# Patient Record
Sex: FEMALE | Race: WHITE | Employment: FULL TIME | URBAN - METROPOLITAN AREA
[De-identification: names, ages, dates, MRNs, and addresses within clinical notes are randomized per-mention and may not be internally consistent; named-entity substitution may affect disease eponyms.]

---

## 2020-10-26 DIAGNOSIS — Z11.59 ENCOUNTER FOR SCREENING FOR OTHER VIRAL DISEASES: Primary | ICD-10-CM

## 2020-12-05 ENCOUNTER — HOSPITAL ENCOUNTER (OUTPATIENT)
Dept: LAB | Facility: CLINIC | Age: 40
Discharge: HOME | End: 2020-12-05
Attending: ORTHOPAEDIC SURGERY | Admitting: ORTHOPAEDIC SURGERY
Payer: COMMERCIAL

## 2020-12-05 DIAGNOSIS — Z11.59 ENCOUNTER FOR SCREENING FOR OTHER VIRAL DISEASES: ICD-10-CM

## 2020-12-05 LAB
SARS-COV-2 RNA SPEC QL NAA+PROBE: NORMAL
SPECIMEN SOURCE: NORMAL

## 2020-12-05 PROCEDURE — U0003 INFECTIOUS AGENT DETECTION BY NUCLEIC ACID (DNA OR RNA); SEVERE ACUTE RESPIRATORY SYNDROME CORONAVIRUS 2 (SARS-COV-2) (CORONAVIRUS DISEASE [COVID-19]), AMPLIFIED PROBE TECHNIQUE, MAKING USE OF HIGH THROUGHPUT TECHNOLOGIES AS DESCRIBED BY CMS-2020-01-R: HCPCS | Performed by: ORTHOPAEDIC SURGERY

## 2020-12-06 LAB
LABORATORY COMMENT REPORT: NORMAL
SARS-COV-2 RNA SPEC QL NAA+PROBE: NEGATIVE
SPECIMEN SOURCE: NORMAL

## 2020-12-07 RX ORDER — HYDROXYZINE HYDROCHLORIDE 25 MG/1
25 TABLET, FILM COATED ORAL 3 TIMES DAILY
COMMUNITY

## 2020-12-07 RX ORDER — CITALOPRAM HYDROBROMIDE 20 MG/1
20 TABLET ORAL DAILY
COMMUNITY

## 2020-12-07 RX ORDER — POLYETHYLENE GLYCOL 3350 17 G/17G
1 POWDER, FOR SOLUTION ORAL DAILY
COMMUNITY

## 2020-12-07 RX ORDER — FLUTICASONE PROPIONATE 50 MCG
2 SPRAY, SUSPENSION (ML) NASAL DAILY
COMMUNITY

## 2020-12-08 ENCOUNTER — ANESTHESIA EVENT (OUTPATIENT)
Dept: SURGERY | Facility: CLINIC | Age: 40
End: 2020-12-08
Payer: COMMERCIAL

## 2020-12-09 ENCOUNTER — ANCILLARY PROCEDURE (OUTPATIENT)
Dept: ULTRASOUND IMAGING | Facility: CLINIC | Age: 40
End: 2020-12-09
Payer: COMMERCIAL

## 2020-12-09 ENCOUNTER — ANESTHESIA (OUTPATIENT)
Dept: SURGERY | Facility: CLINIC | Age: 40
End: 2020-12-09
Payer: COMMERCIAL

## 2020-12-09 ENCOUNTER — HOSPITAL ENCOUNTER (OUTPATIENT)
Facility: CLINIC | Age: 40
Discharge: HOME | End: 2020-12-09
Attending: ORTHOPAEDIC SURGERY | Admitting: ORTHOPAEDIC SURGERY
Payer: COMMERCIAL

## 2020-12-09 VITALS
OXYGEN SATURATION: 98 % | WEIGHT: 185.19 LBS | DIASTOLIC BLOOD PRESSURE: 64 MMHG | HEIGHT: 67 IN | RESPIRATION RATE: 11 BRPM | HEART RATE: 60 BPM | BODY MASS INDEX: 29.07 KG/M2 | SYSTOLIC BLOOD PRESSURE: 121 MMHG | TEMPERATURE: 98.1 F

## 2020-12-09 DIAGNOSIS — Z98.890 S/P KNEE SURGERY: Primary | ICD-10-CM

## 2020-12-09 LAB
ABO + RH BLD: NORMAL
ABO + RH BLD: NORMAL
BLD GP AB SCN SERPL QL: NORMAL
BLOOD BANK CMNT PATIENT-IMP: NORMAL
GLUCOSE SERPL-MCNC: 86 MG/DL (ref 70–99)
HCG UR QL: NEGATIVE
HGB BLD-MCNC: 12 G/DL (ref 11.7–15.7)
SPECIMEN EXP DATE BLD: NORMAL

## 2020-12-09 PROCEDURE — 250N000011 HC RX IP 250 OP 636: Performed by: NURSE ANESTHETIST, CERTIFIED REGISTERED

## 2020-12-09 PROCEDURE — 761N000007 HC RECOVERY PHASE 2 EACH 15 MINS: Performed by: ORTHOPAEDIC SURGERY

## 2020-12-09 PROCEDURE — 85018 HEMOGLOBIN: CPT | Performed by: ANESTHESIOLOGY

## 2020-12-09 PROCEDURE — 258N000003 HC RX IP 258 OP 636: Performed by: ANESTHESIOLOGY

## 2020-12-09 PROCEDURE — 86850 RBC ANTIBODY SCREEN: CPT | Performed by: ANESTHESIOLOGY

## 2020-12-09 PROCEDURE — C1713 ANCHOR/SCREW BN/BN,TIS/BN: HCPCS | Performed by: ORTHOPAEDIC SURGERY

## 2020-12-09 PROCEDURE — 250N000011 HC RX IP 250 OP 636

## 2020-12-09 PROCEDURE — 272N000001 HC OR GENERAL SUPPLY STERILE: Performed by: ORTHOPAEDIC SURGERY

## 2020-12-09 PROCEDURE — 370N000001 HC ANESTHESIA TECHNICAL FEE, 1ST 30 MIN: Performed by: ORTHOPAEDIC SURGERY

## 2020-12-09 PROCEDURE — 360N000022 HC SURGERY LEVEL 3 1ST 30 MIN - UMMC: Performed by: ORTHOPAEDIC SURGERY

## 2020-12-09 PROCEDURE — 999N000139 HC STATISTIC PRE-PROCEDURE ASSESSMENT II: Performed by: ORTHOPAEDIC SURGERY

## 2020-12-09 PROCEDURE — 761N000001 HC RECOVERY PHASE 1 LEVEL 1 FIRST HR: Performed by: ORTHOPAEDIC SURGERY

## 2020-12-09 PROCEDURE — 250N000011 HC RX IP 250 OP 636: Performed by: ORTHOPAEDIC SURGERY

## 2020-12-09 PROCEDURE — 250N000013 HC RX MED GY IP 250 OP 250 PS 637: Performed by: ORTHOPAEDIC SURGERY

## 2020-12-09 PROCEDURE — 370N000002 HC ANESTHESIA TECHNICAL FEE, EACH ADDTL 15 MIN: Performed by: ORTHOPAEDIC SURGERY

## 2020-12-09 PROCEDURE — 258N000003 HC RX IP 258 OP 636: Performed by: REGISTERED NURSE

## 2020-12-09 PROCEDURE — 250N000011 HC RX IP 250 OP 636: Performed by: PHYSICIAN ASSISTANT

## 2020-12-09 PROCEDURE — 250N000009 HC RX 250: Performed by: REGISTERED NURSE

## 2020-12-09 PROCEDURE — 250N000011 HC RX IP 250 OP 636: Performed by: REGISTERED NURSE

## 2020-12-09 PROCEDURE — 360N000023 HC SURGERY LEVEL 3 EA 15 ADDTL MIN UMMC: Performed by: ORTHOPAEDIC SURGERY

## 2020-12-09 PROCEDURE — 81025 URINE PREGNANCY TEST: CPT | Performed by: ANESTHESIOLOGY

## 2020-12-09 PROCEDURE — 250N000009 HC RX 250: Performed by: ORTHOPAEDIC SURGERY

## 2020-12-09 PROCEDURE — 82947 ASSAY GLUCOSE BLOOD QUANT: CPT | Performed by: ANESTHESIOLOGY

## 2020-12-09 PROCEDURE — 272N000002 HC OR SUPPLY OTHER OPNP: Performed by: ORTHOPAEDIC SURGERY

## 2020-12-09 PROCEDURE — 258N000001 HC RX 258: Performed by: ORTHOPAEDIC SURGERY

## 2020-12-09 PROCEDURE — 86900 BLOOD TYPING SEROLOGIC ABO: CPT | Performed by: ANESTHESIOLOGY

## 2020-12-09 PROCEDURE — 86901 BLOOD TYPING SEROLOGIC RH(D): CPT | Performed by: ANESTHESIOLOGY

## 2020-12-09 DEVICE — IMPLANTABLE DEVICE: Type: IMPLANTABLE DEVICE | Site: KNEE | Status: FUNCTIONAL

## 2020-12-09 RX ORDER — OXYCODONE HYDROCHLORIDE 5 MG/1
5 TABLET ORAL
Status: DISCONTINUED | OUTPATIENT
Start: 2020-12-09 | End: 2020-12-09 | Stop reason: HOSPADM

## 2020-12-09 RX ORDER — NALOXONE HYDROCHLORIDE 0.4 MG/ML
0.2 INJECTION, SOLUTION INTRAMUSCULAR; INTRAVENOUS; SUBCUTANEOUS
Status: DISCONTINUED | OUTPATIENT
Start: 2020-12-09 | End: 2020-12-09 | Stop reason: HOSPADM

## 2020-12-09 RX ORDER — NALOXONE HYDROCHLORIDE 0.4 MG/ML
0.4 INJECTION, SOLUTION INTRAMUSCULAR; INTRAVENOUS; SUBCUTANEOUS
Status: DISCONTINUED | OUTPATIENT
Start: 2020-12-09 | End: 2020-12-09 | Stop reason: HOSPADM

## 2020-12-09 RX ORDER — ACETAMINOPHEN 325 MG/1
650 TABLET ORAL EVERY 4 HOURS PRN
Qty: 50 TABLET | Refills: 0 | Status: SHIPPED | OUTPATIENT
Start: 2020-12-09

## 2020-12-09 RX ORDER — SODIUM CHLORIDE, SODIUM LACTATE, POTASSIUM CHLORIDE, CALCIUM CHLORIDE 600; 310; 30; 20 MG/100ML; MG/100ML; MG/100ML; MG/100ML
INJECTION, SOLUTION INTRAVENOUS CONTINUOUS
Status: DISCONTINUED | OUTPATIENT
Start: 2020-12-09 | End: 2020-12-09 | Stop reason: HOSPADM

## 2020-12-09 RX ORDER — ACETAMINOPHEN 325 MG/1
650 TABLET ORAL
Status: DISCONTINUED | OUTPATIENT
Start: 2020-12-09 | End: 2020-12-09 | Stop reason: HOSPADM

## 2020-12-09 RX ORDER — BUPIVACAINE HYDROCHLORIDE AND EPINEPHRINE 5; 5 MG/ML; UG/ML
INJECTION, SOLUTION PERINEURAL PRN
Status: DISCONTINUED | OUTPATIENT
Start: 2020-12-09 | End: 2020-12-09 | Stop reason: HOSPADM

## 2020-12-09 RX ORDER — MEPERIDINE HYDROCHLORIDE 25 MG/ML
12.5 INJECTION INTRAMUSCULAR; INTRAVENOUS; SUBCUTANEOUS
Status: DISCONTINUED | OUTPATIENT
Start: 2020-12-09 | End: 2020-12-09 | Stop reason: HOSPADM

## 2020-12-09 RX ORDER — HYDROXYZINE HYDROCHLORIDE 25 MG/1
50 TABLET, FILM COATED ORAL EVERY 6 HOURS PRN
Status: DISCONTINUED | OUTPATIENT
Start: 2020-12-09 | End: 2020-12-09 | Stop reason: HOSPADM

## 2020-12-09 RX ORDER — DEXAMETHASONE SODIUM PHOSPHATE 4 MG/ML
INJECTION, SOLUTION INTRA-ARTICULAR; INTRALESIONAL; INTRAMUSCULAR; INTRAVENOUS; SOFT TISSUE PRN
Status: DISCONTINUED | OUTPATIENT
Start: 2020-12-09 | End: 2020-12-09

## 2020-12-09 RX ORDER — BUPIVACAINE HYDROCHLORIDE 2.5 MG/ML
INJECTION, SOLUTION EPIDURAL; INFILTRATION; INTRACAUDAL PRN
Status: DISCONTINUED | OUTPATIENT
Start: 2020-12-09 | End: 2020-12-09

## 2020-12-09 RX ORDER — KETOROLAC TROMETHAMINE 30 MG/ML
30 INJECTION, SOLUTION INTRAMUSCULAR; INTRAVENOUS ONCE
Status: COMPLETED | OUTPATIENT
Start: 2020-12-09 | End: 2020-12-09

## 2020-12-09 RX ORDER — OXYCODONE HYDROCHLORIDE 5 MG/1
5-10 TABLET ORAL EVERY 4 HOURS PRN
Qty: 40 TABLET | Refills: 0 | Status: SHIPPED | OUTPATIENT
Start: 2020-12-09

## 2020-12-09 RX ORDER — IBUPROFEN 200 MG
200 TABLET ORAL EVERY 6 HOURS PRN
Status: DISCONTINUED | OUTPATIENT
Start: 2020-12-09 | End: 2020-12-09 | Stop reason: HOSPADM

## 2020-12-09 RX ORDER — ONDANSETRON 4 MG/1
4 TABLET, ORALLY DISINTEGRATING ORAL
Status: DISCONTINUED | OUTPATIENT
Start: 2020-12-09 | End: 2020-12-09 | Stop reason: HOSPADM

## 2020-12-09 RX ORDER — ONDANSETRON 2 MG/ML
INJECTION INTRAMUSCULAR; INTRAVENOUS PRN
Status: DISCONTINUED | OUTPATIENT
Start: 2020-12-09 | End: 2020-12-09

## 2020-12-09 RX ORDER — FENTANYL CITRATE 50 UG/ML
25-50 INJECTION, SOLUTION INTRAMUSCULAR; INTRAVENOUS
Status: DISCONTINUED | OUTPATIENT
Start: 2020-12-09 | End: 2020-12-09 | Stop reason: HOSPADM

## 2020-12-09 RX ORDER — PROPOFOL 10 MG/ML
INJECTION, EMULSION INTRAVENOUS PRN
Status: DISCONTINUED | OUTPATIENT
Start: 2020-12-09 | End: 2020-12-09

## 2020-12-09 RX ORDER — PROPOFOL 10 MG/ML
INJECTION, EMULSION INTRAVENOUS CONTINUOUS PRN
Status: DISCONTINUED | OUTPATIENT
Start: 2020-12-09 | End: 2020-12-09

## 2020-12-09 RX ORDER — AMOXICILLIN 250 MG
1-2 CAPSULE ORAL 2 TIMES DAILY
Qty: 30 TABLET | Refills: 0 | Status: SHIPPED | OUTPATIENT
Start: 2020-12-09

## 2020-12-09 RX ORDER — FLUMAZENIL 0.1 MG/ML
0.2 INJECTION, SOLUTION INTRAVENOUS
Status: DISCONTINUED | OUTPATIENT
Start: 2020-12-09 | End: 2020-12-09 | Stop reason: HOSPADM

## 2020-12-09 RX ORDER — ONDANSETRON 4 MG/1
4 TABLET, ORALLY DISINTEGRATING ORAL EVERY 30 MIN PRN
Status: DISCONTINUED | OUTPATIENT
Start: 2020-12-09 | End: 2020-12-09 | Stop reason: HOSPADM

## 2020-12-09 RX ORDER — KETAMINE HYDROCHLORIDE 10 MG/ML
INJECTION INTRAMUSCULAR; INTRAVENOUS PRN
Status: DISCONTINUED | OUTPATIENT
Start: 2020-12-09 | End: 2020-12-09

## 2020-12-09 RX ORDER — IBUPROFEN 600 MG/1
600 TABLET, FILM COATED ORAL EVERY 6 HOURS PRN
Qty: 30 TABLET | Refills: 0 | Status: SHIPPED | OUTPATIENT
Start: 2020-12-09

## 2020-12-09 RX ORDER — LIDOCAINE 40 MG/G
CREAM TOPICAL
Status: DISCONTINUED | OUTPATIENT
Start: 2020-12-09 | End: 2020-12-09 | Stop reason: HOSPADM

## 2020-12-09 RX ORDER — CEFAZOLIN SODIUM 1 G/3ML
1 INJECTION, POWDER, FOR SOLUTION INTRAMUSCULAR; INTRAVENOUS SEE ADMIN INSTRUCTIONS
Status: DISCONTINUED | OUTPATIENT
Start: 2020-12-09 | End: 2020-12-09 | Stop reason: HOSPADM

## 2020-12-09 RX ORDER — CEFAZOLIN SODIUM 2 G/100ML
2 INJECTION, SOLUTION INTRAVENOUS
Status: DISCONTINUED | OUTPATIENT
Start: 2020-12-09 | End: 2020-12-09 | Stop reason: HOSPADM

## 2020-12-09 RX ORDER — ACETAMINOPHEN 325 MG/1
975 TABLET ORAL ONCE
Status: DISCONTINUED | OUTPATIENT
Start: 2020-12-09 | End: 2020-12-09 | Stop reason: HOSPADM

## 2020-12-09 RX ORDER — ASPIRIN 81 MG/1
162 TABLET ORAL DAILY
Status: DISCONTINUED | OUTPATIENT
Start: 2020-12-10 | End: 2020-12-09 | Stop reason: HOSPADM

## 2020-12-09 RX ORDER — HYDROXYZINE HYDROCHLORIDE 10 MG/1
10 TABLET, FILM COATED ORAL
Status: DISCONTINUED | OUTPATIENT
Start: 2020-12-09 | End: 2020-12-09 | Stop reason: HOSPADM

## 2020-12-09 RX ORDER — ONDANSETRON 4 MG/1
4-8 TABLET, ORALLY DISINTEGRATING ORAL EVERY 8 HOURS PRN
Qty: 8 TABLET | Refills: 0 | Status: SHIPPED | OUTPATIENT
Start: 2020-12-09

## 2020-12-09 RX ORDER — HYDROXYZINE HYDROCHLORIDE 25 MG/1
25 TABLET, FILM COATED ORAL
Status: DISCONTINUED | OUTPATIENT
Start: 2020-12-09 | End: 2020-12-09 | Stop reason: HOSPADM

## 2020-12-09 RX ORDER — IBUPROFEN 200 MG
800 TABLET ORAL EVERY 4 HOURS PRN
COMMUNITY

## 2020-12-09 RX ORDER — ONDANSETRON 2 MG/ML
4 INJECTION INTRAMUSCULAR; INTRAVENOUS EVERY 30 MIN PRN
Status: DISCONTINUED | OUTPATIENT
Start: 2020-12-09 | End: 2020-12-09 | Stop reason: HOSPADM

## 2020-12-09 RX ADMIN — PROPOFOL 50 MG: 10 INJECTION, EMULSION INTRAVENOUS at 13:15

## 2020-12-09 RX ADMIN — PROPOFOL 200 MG: 10 INJECTION, EMULSION INTRAVENOUS at 13:00

## 2020-12-09 RX ADMIN — PROPOFOL 150 MCG/KG/MIN: 10 INJECTION, EMULSION INTRAVENOUS at 13:00

## 2020-12-09 RX ADMIN — BUPIVACAINE HYDROCHLORIDE 30 ML: 2.5 INJECTION, SOLUTION EPIDURAL; INFILTRATION; INTRACAUDAL at 12:05

## 2020-12-09 RX ADMIN — DEXAMETHASONE SODIUM PHOSPHATE 6 MG: 4 INJECTION, SOLUTION INTRAMUSCULAR; INTRAVENOUS at 13:13

## 2020-12-09 RX ADMIN — ONDANSETRON 4 MG: 2 INJECTION INTRAMUSCULAR; INTRAVENOUS at 14:18

## 2020-12-09 RX ADMIN — DEXMEDETOMIDINE HYDROCHLORIDE 12 MCG: 100 INJECTION, SOLUTION INTRAVENOUS at 13:31

## 2020-12-09 RX ADMIN — FENTANYL CITRATE 25 MCG: 50 INJECTION INTRAMUSCULAR; INTRAVENOUS at 12:11

## 2020-12-09 RX ADMIN — PROPOFOL 50 MG: 10 INJECTION, EMULSION INTRAVENOUS at 13:24

## 2020-12-09 RX ADMIN — Medication 30 MG: at 13:22

## 2020-12-09 RX ADMIN — Medication 20 MG: at 14:02

## 2020-12-09 RX ADMIN — ONDANSETRON 4 MG: 2 INJECTION INTRAMUSCULAR; INTRAVENOUS at 16:17

## 2020-12-09 RX ADMIN — DEXMEDETOMIDINE HYDROCHLORIDE 16 MCG: 100 INJECTION, SOLUTION INTRAVENOUS at 13:36

## 2020-12-09 RX ADMIN — ACETAMINOPHEN 650 MG: 325 TABLET, FILM COATED ORAL at 14:59

## 2020-12-09 RX ADMIN — SODIUM CHLORIDE, POTASSIUM CHLORIDE, SODIUM LACTATE AND CALCIUM CHLORIDE: 600; 310; 30; 20 INJECTION, SOLUTION INTRAVENOUS at 12:45

## 2020-12-09 RX ADMIN — DEXMEDETOMIDINE HYDROCHLORIDE 12 MCG: 100 INJECTION, SOLUTION INTRAVENOUS at 13:27

## 2020-12-09 RX ADMIN — SODIUM CHLORIDE, POTASSIUM CHLORIDE, SODIUM LACTATE AND CALCIUM CHLORIDE: 600; 310; 30; 20 INJECTION, SOLUTION INTRAVENOUS at 14:40

## 2020-12-09 RX ADMIN — MIDAZOLAM 1 MG: 1 INJECTION INTRAMUSCULAR; INTRAVENOUS at 12:12

## 2020-12-09 RX ADMIN — CEFAZOLIN 2 G: 10 INJECTION, POWDER, FOR SOLUTION INTRAVENOUS at 13:16

## 2020-12-09 RX ADMIN — KETOROLAC TROMETHAMINE 30 MG: 30 INJECTION, SOLUTION INTRAMUSCULAR at 15:30

## 2020-12-09 ASSESSMENT — MIFFLIN-ST. JEOR: SCORE: 1542.63

## 2020-12-09 NOTE — ANESTHESIA POSTPROCEDURE EVALUATION
Anesthesia POST Procedure Evaluation    Patient: Lydia Rockwell   MRN:     [id number] Gender:   female   Age:    39 year old :      1980        Preoperative Diagnosis: Knee pain [M25.569]   Procedure(s):  Right Knee Arthroscopy,  Tibial Plateu debridement,  microfracture,  Biocartlidge right knee partial Meniscectomy, chrondroplasty,  allogenic chondrocyte implantation   Postop Comments: No value filed.     Anesthesia Type: General       Disposition: Outpatient   Postop Pain Control: Uneventful            Sign Out: Well controlled pain   PONV: No   Neuro/Psych: Uneventful            Sign Out: Acceptable/Baseline neuro status   Airway/Respiratory: Uneventful            Sign Out: Acceptable/Baseline resp. status   CV/Hemodynamics: Uneventful            Sign Out: Acceptable CV status   Other NRE: NONE   DID A NON-ROUTINE EVENT OCCUR? No    Event details/Postop Comments:  I assessed the patient in PACU prior to discharge.  The patient was awake and alert with minimal to moderate pain which was well controlled with medications and regional anesthetic.  The patient denied any significant nausea.  The patient's heart rate and blood pressure with consistent with her preoperative measurements, and she was breathing comfortably without any signs of respiratory distress.  There were no readily apparent anesthetic complications.  All her questions were answered.         Last Anesthesia Record Vitals:  CRNA VITALS  2020 1407 - 2020 1507      2020             NIBP:  99/65    Pulse:  72    NIBP Mean:  74    Temp:  37.7  C (99.9  F)    SpO2:  100 %    Resp Rate (observed):  16          Last PACU Vitals:  Vitals Value Taken Time   /63 20 1545   Temp 37.7  C (99.9  F) 20 1440   Pulse 63 20 1545   Resp 11 20 1542   SpO2 99 % 20 1555   Temp src     NIBP 99/65 20 1441   Pulse 72 20 1441   SpO2 100 % 20 1441   Resp     Temp 37.7  C (99.9  F) 20 1441    Ht Rate     Temp 2     Vitals shown include unvalidated device data.      Electronically Signed By: Vega Kim MD, December 9, 2020, 3:56 PM

## 2020-12-09 NOTE — ANESTHESIA CARE TRANSFER NOTE
Patient: Lydia Rockwell    Procedure(s):  Right Knee Arthroscopy,  Tibial Plateu debridement,  microfracture,  Biocartlidge right knee partial Meniscectomy, chrondroplasty,  allogenic chondrocyte implantation    Diagnosis: Knee pain [M25.569]  Diagnosis Additional Information: No value filed.    Anesthesia Type:   General     Note:  Airway :Face Mask  Patient transferred to:PACU  Handoff Report: Identifed the Patient, Identified the Reponsible Provider, Reviewed the pertinent medical history, Discussed the surgical course, Reviewed Intra-OP anesthesia mangement and issues during anesthesia, Set expectations for post-procedure period and Allowed opportunity for questions and acknowledgement of understanding      Vitals: (Last set prior to Anesthesia Care Transfer)    CRNA VITALS  12/9/2020 1407 - 12/9/2020 1448      12/9/2020             NIBP:  99/65    Pulse:  72    NIBP Mean:  74    Temp:  37.7  C (99.9  F)    SpO2:  100 %    Resp Rate (observed):  16                Electronically Signed By: DAMON Barber CRNA  December 9, 2020  2:48 PM

## 2020-12-09 NOTE — ANESTHESIA PREPROCEDURE EVALUATION
"Anesthesia Pre-Procedure Evaluation    Patient: Lydia Rockwell   MRN:     [id number] Gender:   female   Age:    39 year old :      1980        Preoperative Diagnosis: Knee pain [M25.569]   Procedure(s):  Right Knee Arthroscopy,  Tibial Plateu debridement,  microfracture,  Biocartlidge right knee partial Meniscectomy, chrondroplasty,  allogenic chondrocyte implantation     LABS:  CBC:   Lab Results   Component Value Date    HGB 12.0 2020     BMP:   Lab Results   Component Value Date    GLC 86 2020     COAGS: No results found for: PTT, INR, FIBR  POC:   Lab Results   Component Value Date    HCG Negative 2020     OTHER: No results found for: PH, LACT, A1C, MIKI, PHOS, MAG, ALBUMIN, PROTTOTAL, ALT, AST, GGT, ALKPHOS, BILITOTAL, BILIDIRECT, LIPASE, AMYLASE, JACOB, TSH, T4, T3, CRP, SED     Preop Vitals    BP Readings from Last 3 Encounters:   20 103/67    Pulse Readings from Last 3 Encounters:   20 66      Resp Readings from Last 3 Encounters:   20 14    SpO2 Readings from Last 3 Encounters:   20 99%      Temp Readings from Last 1 Encounters:   20 37.7  C (99.9  F) (Axillary)    Ht Readings from Last 1 Encounters:   20 1.702 m (5' 7\")      Wt Readings from Last 1 Encounters:   20 84 kg (185 lb 3 oz)    Estimated body mass index is 29 kg/m  as calculated from the following:    Height as of this encounter: 1.702 m (5' 7\").    Weight as of this encounter: 84 kg (185 lb 3 oz).     LDA:  Peripheral IV 20 Left Hand (Active)   Site Assessment WDL 20 1530   Dressing Intervention Padding of hub 20 0957   Phlebitis Scale 0-->no symptoms 20 1530   Infiltration Scale 0 20 1530   Number of days: 0        Past Medical History:   Diagnosis Date     Amblyopia      Depression      Melanoma of skin (H)      PONV (postoperative nausea and vomiting)       Past Surgical History:   Procedure Laterality Date     APPENDECTOMY       " ARTHROSCOPY KNEE       CHOLECYSTECTOMY       EYE SURGERY Right     melanoma removed from right eye     wisdom teeth extraction        Allergies   Allergen Reactions     Codeine Nausea and Vomiting        Anesthesia Evaluation     . Pt has had prior anesthetic. Type: General, MAC and Regional    History of anesthetic complications   - PONV        ROS/MED HX    ENT/Pulmonary:  - neg pulmonary ROS     Neurologic:  - neg neurologic ROS     Cardiovascular:  - neg cardiovascular ROS       METS/Exercise Tolerance:  >4 METS   Hematologic:  - neg hematologic  ROS       Musculoskeletal:         GI/Hepatic:  - neg GI/hepatic ROS       Renal/Genitourinary:  - ROS Renal section negative       Endo:         Psychiatric:         Infectious Disease:         Malignancy:         Other:                         PHYSICAL EXAM:   Mental Status/Neuro: A/A/O   Airway: Facies: Feasible  Mallampati: I  Mouth/Opening: Full  TM distance: > 6 cm  Neck ROM: Full   Respiratory: Auscultation: CTAB     Resp. Rate: Normal     Resp. Effort: Normal      CV: Rhythm: Regular  Rate: Age appropriate  Heart: Normal Sounds  Edema: None   Comments:      Dental: Normal Dentition                Assessment:   ASA SCORE: 1    H&P: History and physical reviewed and following examination; no interval change.   Smoking Status:  Non-Smoker/Unknown   NPO Status: NPO Appropriate     Plan:   Anes. Type:  MAC   Pre-Medication: None   Induction:  N/a   Airway: Native Airway   Access/Monitoring: PIV   Maintenance: N/a     Postop Plan:   Postop Pain: None  Postop Sedation/Airway: Not planned  Disposition: Outpatient     PONV Management:   Adult Risk Factors: Female, H/o PONV or Motion Sickness, Non-Smoker   Prevention: Ondansetron, Dexamethasone, Scopolamine, Propofol, No Volatiles     CONSENT: Direct conversation   Plan and risks discussed with: Patient   Blood Products: Consented (ALL Blood Products)                   Vega Kim MD

## 2020-12-09 NOTE — BRIEF OP NOTE
[facility] Brief Operative Note    Pre-operative diagnosis: Right knee lateral tibial plateau chondral lesion   Post-operative diagnosis Right knee lateral tibial plateau chondral lesion  Right knee medial meniscal tear     Procedure: Right knee arthroscopy, microfracture, allogenic chondrocyte implantation, partial medial meniscectomy   Surgeon(s): Surgeon(s) and Role:     * Jarad Matson MD - Primary   Sincere Kapoor MD - Orthopedic fellow   Estimated blood loss: 5 ml    Specimens: None   Findings: GETA, spinal, regional block

## 2020-12-09 NOTE — DISCHARGE INSTRUCTIONS
Take 2 baby aspirin (80 mg each for 160mg total ) daily for 6 weeks.    Okay to shower after 5 days.    Same-Day Surgery   Adult Discharge Orders & Instructions     For 24 hours after surgery:  1. Get plenty of rest.  A responsible adult must stay with you for at least 24 hours after you leave the hospital.   2. Pain medication can slow your reflexes. Do not drive or use heavy equipment.  If you have weakness or tingling, don't drive or use heavy equipment until this feeling goes away.  3. Mixing alcohol and pain medication can cause dizziness and slow your breathing. It can even be fatal. Do not drink alcohol while taking pain medication.  4. Avoid strenuous or risky activities.  Ask for help when climbing stairs.   5. You may feel lightheaded.  If so, sit for a few minutes before standing.  Have someone help you get up.   6. If you have nausea (feel sick to your stomach), drink only clear liquids such as apple juice, ginger ale, broth or 7-Up.  Rest may also help.  Be sure to drink enough fluids.  Move to a regular diet as you feel able. Take pain medications with a small amount of solid food, such as toast or crackers, to avoid nausea.   7. A slight fever is normal. Call the doctor if your fever is over 100 F (37.7 C) (taken under the tongue) or lasts longer than 24 hours.  8. You may have a dry mouth, muscle aches, trouble sleeping or a sore throat.  These symptoms should go away after 24 hours.  9. Do not make important or legal decisions.   Pain Management:      1. Take pain medication (if prescribed) for pain as directed by your physician.        2. WARNING: If the pain medication you have been prescribed contains Tylenol  (acetaminophen), DO NOT take additional doses of Tylenol (acetaminophen).     Call your doctor for any of the followin.  Signs of infection (fever, growing tenderness at the surgery site, severe pain, a large amount of drainage or bleeding, foul-smelling drainage, redness,  swelling).    2.  It has been over 8 to 10 hours since surgery and you are still not able to urinate (pee).    3.  Headache for over 24 hours.    4.  Numbness, tingling or weakness the day after surgery (if you had spinal anesthesia).  To contact a doctor, call _______Or Resident on call__________ or:      [phone] and ask for the Resident On Call for:          __________Or Resident on call__ (answered 24 hours a day)      Emergency Department:  [facility]: [phone]  [facility]: [phone]              Scopolamine Patch  (Absorbed through the skin)    The Scopolamine Patch prevents nausea and vomiting caused by motion sickness or anesthesia and surgery in adults.    Brand Name(s): Transderm Scop, Transderm-Scope  There may be other brand names for this medicine.    When This Medicine Should Not Be Used:  You should not use this medicine if you have had an allergic reaction to scopolamine, or if you have narrow angle glaucoma.    How to Use This Medicine:    Your doctor will tell you how many patches to use, where to apply them, and how often to apply them.     Do not use more patches or apply them more often than your doctor tells you to.    This medicine comes with patient instructions. Read and follow these instructions carefully. Ask your doctor or pharmacist if you have any questions.    To prevent motion sickness, apply the patch at least 4 hours before you need it.    Wash and dry your hands thoroughly before applying the patch.    Leave the patch in its sealed wrapper until you are ready to put it on. Tear the wrapper open carefully. NEVER CUT the wrapper or the patch with scissors. Do not use any patch that has been cut by accident.    Take the liner off the sticky side before applying.    Apply the patch to dry, hairless skin behind the ear.    If the patch is loose or falls off, apply a new patch at a different place behind the ear.    After you take off  the patch, wash the place where the patch was and your hands thoroughly.    Only one patch should be used at any time.    If a dose is missed:  If you forget to wear or change a patch, put one on as soon as you can. If it is almost time to put on your next patch, wait until then to apply a new patch and skip the one you missed. Do not apply extra patches to make up for a missed dose.    How to Store and Dispose of This Medicine:    Store the patches at room temperature in a closed container, away from heat, moisture and direct light.    Fold the used patch in half with the sticky sides together. Throw any used patch away so that children or pets cannot get to it. You will also need to throw away old patches after the expiration date has passed.    Keep all medicine away from children and never share your medicine with anyone.    Ask your doctor or pharmacist before using any other medicine, including over-the-counter medicines, vitamins, and herbal products.  Tell your doctor if you are using any medicines that make you sleepy. These include sleeping pills, cold and allergy medicine, narcotic pain relievers and sedatives.     Tell your doctor if you are using any medicine that make you sleepy. These include sleeping pills, pinky and allergy medicine, narcotic pain relievers and sedatives.    Do not drink alcohol while you are using this medicine.     Warnings While Using This Medicine:    Make sure your doctor knows if you are pregnant or breastfeeding or if you have glaucoma, prostate problems, trouble urinating, blocked bowels, liver disease, kidney disease or a history of seizures or mental illness.    This medicine can cause blurring of vision and other vision problems if it comes in contact with the eyes. This medicine may also cause problems with urination. If any of these reactions occur, remove the patch and call your doctor right away.    This medicine may make you dizzy or drowsy. Avoid driving, using  machines, or doing anything else that could be dangerous if you are not alert. If you plan to participate in underwater sports, this medicine may cause disorienting effects. If this is a concern for you, talk with your doctor.    This medicine may make you sweat less and cause your body to get too hot. Be careful in hot weather, when you are exercising or if using sauna or whirlpool.    Make sure any doctor or dentist who treats you knows that you are using this medicine. This medicine may affect the results of certain medical tests.    Skin burns have been reported at the patch site in several patients wearing an aluminized transdermal system during a magnetic resonance imaging scan (MRI). Because Transderm Scop contains aluminum, it is recommended to remove the system before undergoing an MRI.    Call your doctor right away if you notice any of these side effects:    Allergic reaction: Itching or hives, swelling in your face or hands, swelling or tingling in your mouth or throat, chest tightness, trouble breathing    Blurred vision    Confusion or memory loss    Fast, slow or uneven heartbeat    Lightheadedness, dizziness, drowsiness or fainting    Seeing, hearing or feeling things that are not there    Severe eye pain    Trouble urinating    If you notice these less serious side effects, talk with your doctor:    Dry mouth    Dry, itchy or red eyes    Restlessness    Skin rash or redness    If you notice other side effects that you think are caused by this medicine, tell your doctor immediately.

## 2020-12-09 NOTE — ANESTHESIA PROCEDURE NOTES
Airway   Date/Time: 12/9/2020 1:03 PM   Patient location during procedure: OR    Staff -   CRNA: Chel Taylor APRN CRNA  Performed By: CRNA    Indications and Patient Condition  Indications for airway management: frances-procedural  Induction type:intravenousMask difficulty assessment: 1 - vent by mask    Final Airway Details  Final airway type: supraglottic airway    Endotracheal Airway Details   Secured with: silk tape    Post intubation assessment   Placement verified by: capnometry, equal breath sounds and chest rise   Number of attempts at approach: 1  Secured with:silk tape  Ease of procedure: easy  Dentition: Unchanged

## 2020-12-09 NOTE — OR NURSING
RN spoke with Jeison,  over the phone regarding discharge meds. Discussed when can have next. Pt not being sent home with baby aspirin so family will need to purchase at a pharmacy. No further questions at this time.

## 2020-12-09 NOTE — ANESTHESIA PROCEDURE NOTES
Spinal/LP Procedure Note    Spinal Block      Staff -   Anesthesiologist:  Vega Kim MD  Resident/Fellow: Miguel bAel MD  Performed By: resident  Location: In OR BEFORE Induction  Procedure Start/Stop Times:     patient identified, IV checked, site marked, risks and benefits discussed, informed consent, monitors and equipment checked, pre-op evaluation, at physician/surgeon's request and post-op pain management      Correct Patient: Yes      Correct Position: Yes      Correct Site: Yes      Correct Procedure: Yes      Correct Laterality:  N/A    Site Marked:  Yes  Procedure:     Procedure:  Intrathecal    ASA:  1    Position:  Sitting    Sterile Prep: chloraprep, mask, sterile gloves and patient draped      Insertion site:  L4-5    Approach:  Midline    Needle Type:  Pencan    Needle gauge (G):  25    Local Skin Infiltration:  1% lidocaine    amount (ml):  3    Needle Length (in):  3.5    Introducer used: Yes      Introducer gauge:  20 G    Attempts:  1    Redirects:  1    CSF:  Clear    Paresthesias:  No  Assessment/Narrative:      Lumbar 4/5 spinal injection performed by resident physician under my direct supervision.  IT space accessed after 1 redirection on first attempt with free flow of fluid.  Barbotage with injectate demonstrated appropriate mixing of fluid at the beginning, middle, and end of the injection, however after completion of the injection and supination of the patient no anesthesia was observed.  Proceeded with IVIA and GETA.  Issue reported to [facility] director.

## 2020-12-09 NOTE — ANESTHESIA PROCEDURE NOTES
Peripheral Nerve Block Procedure Note      Staff -   Anesthesiologist:  Vega Kim MD  Resident/Fellow: Miguel Abel MD  Performed By: anesthesiologist and with residents  Procedure performed by resident/CRNA in presence of a teaching physician.    Location: Pre-op  Procedure Start/Stop TImes:      12/9/2020 12:00 PM     12/9/2020 12:10 PM    patient identified, IV checked, site marked, risks and benefits discussed, informed consent, monitors and equipment checked, pre-op evaluation, at physician/surgeon's request and post-op pain management      Correct Patient: Yes      Correct Position: Yes      Correct Site: Yes      Correct Procedure: Yes      Correct Laterality:  Yes    Site Marked:  Yes  Procedure details:     Procedure:  Femoral and Other (left genicular)    ASA:  2 and 3    Diagnosis:  Postoperative pain relief    Laterality:  Right    Position:  Supine    Sterile Prep: chloraprep, mask and sterile gloves      Local skin infiltration:  None    Needle:  Insulated    Needle gauge:  21    Needle length (mm):  110    Ultrasound: Yes      Ultrasound used to identify targeted nerve, plexus, or vascular structure and placed a needle adjacent to it      Permanent Image entered into patiient's record      Abnormal pain on injection: No      Blood Aspirated: No      Paresthesias:  No    Bleeding at site: No      Bolus via:  Needle    Infusion Method:  Single Shot    Complications:  None  Assessment/Narrative:     Injection made incrementally with aspirations every (mL):  5

## 2020-12-10 ENCOUNTER — MEDICAL CORRESPONDENCE (OUTPATIENT)
Dept: HEALTH INFORMATION MANAGEMENT | Facility: CLINIC | Age: 40
End: 2020-12-10

## 2020-12-12 ENCOUNTER — ANESTHESIA EVENT (OUTPATIENT)
Dept: EMERGENCY MEDICINE | Facility: CLINIC | Age: 40
End: 2020-12-12
Payer: COMMERCIAL

## 2020-12-12 ENCOUNTER — HOSPITAL ENCOUNTER (EMERGENCY)
Facility: CLINIC | Age: 40
Discharge: HOME | End: 2020-12-12
Attending: EMERGENCY MEDICINE | Admitting: EMERGENCY MEDICINE
Payer: COMMERCIAL

## 2020-12-12 ENCOUNTER — ANESTHESIA (OUTPATIENT)
Dept: EMERGENCY MEDICINE | Facility: CLINIC | Age: 40
End: 2020-12-12
Payer: COMMERCIAL

## 2020-12-12 ENCOUNTER — NURSE TRIAGE (OUTPATIENT)
Dept: NURSING | Facility: CLINIC | Age: 40
End: 2020-12-12

## 2020-12-12 VITALS
SYSTOLIC BLOOD PRESSURE: 111 MMHG | HEART RATE: 82 BPM | TEMPERATURE: 98.5 F | DIASTOLIC BLOOD PRESSURE: 74 MMHG | RESPIRATION RATE: 14 BRPM | OXYGEN SATURATION: 99 %

## 2020-12-12 DIAGNOSIS — G97.1 POST-DURAL PUNCTURE HEADACHE: ICD-10-CM

## 2020-12-12 LAB
ANION GAP SERPL CALCULATED.3IONS-SCNC: 5 MMOL/L (ref 3–14)
BASOPHILS # BLD AUTO: 0.1 10E9/L (ref 0–0.2)
BASOPHILS NFR BLD AUTO: 0.9 %
BUN SERPL-MCNC: 8 MG/DL (ref 7–30)
CALCIUM SERPL-MCNC: 8.3 MG/DL (ref 8.5–10.1)
CHLORIDE SERPL-SCNC: 111 MMOL/L (ref 94–109)
CO2 SERPL-SCNC: 26 MMOL/L (ref 20–32)
CREAT SERPL-MCNC: 0.71 MG/DL (ref 0.52–1.04)
DIFFERENTIAL METHOD BLD: NORMAL
EOSINOPHIL # BLD AUTO: 0.5 10E9/L (ref 0–0.7)
EOSINOPHIL NFR BLD AUTO: 8.2 %
ERYTHROCYTE [DISTWIDTH] IN BLOOD BY AUTOMATED COUNT: 13.1 % (ref 10–15)
GFR SERPL CREATININE-BSD FRML MDRD: >90 ML/MIN/{1.73_M2}
GLUCOSE SERPL-MCNC: 93 MG/DL (ref 70–99)
HCG SERPL QL: NEGATIVE
HCT VFR BLD AUTO: 36.6 % (ref 35–47)
HGB BLD-MCNC: 12 G/DL (ref 11.7–15.7)
IMM GRANULOCYTES # BLD: 0 10E9/L (ref 0–0.4)
IMM GRANULOCYTES NFR BLD: 0.2 %
LYMPHOCYTES # BLD AUTO: 1.7 10E9/L (ref 0.8–5.3)
LYMPHOCYTES NFR BLD AUTO: 30.8 %
MCH RBC QN AUTO: 28 PG (ref 26.5–33)
MCHC RBC AUTO-ENTMCNC: 32.8 G/DL (ref 31.5–36.5)
MCV RBC AUTO: 85 FL (ref 78–100)
MONOCYTES # BLD AUTO: 0.4 10E9/L (ref 0–1.3)
MONOCYTES NFR BLD AUTO: 6.4 %
NEUTROPHILS # BLD AUTO: 3 10E9/L (ref 1.6–8.3)
NEUTROPHILS NFR BLD AUTO: 53.5 %
NRBC # BLD AUTO: 0 10*3/UL
NRBC BLD AUTO-RTO: 0 /100
PLATELET # BLD AUTO: 280 10E9/L (ref 150–450)
POTASSIUM SERPL-SCNC: 3.6 MMOL/L (ref 3.4–5.3)
RBC # BLD AUTO: 4.29 10E12/L (ref 3.8–5.2)
SODIUM SERPL-SCNC: 142 MMOL/L (ref 133–144)
WBC # BLD AUTO: 5.6 10E9/L (ref 4–11)

## 2020-12-12 PROCEDURE — 99284 EMERGENCY DEPT VISIT MOD MDM: CPT | Mod: 25

## 2020-12-12 PROCEDURE — 80048 BASIC METABOLIC PNL TOTAL CA: CPT | Performed by: EMERGENCY MEDICINE

## 2020-12-12 PROCEDURE — 96374 THER/PROPH/DIAG INJ IV PUSH: CPT

## 2020-12-12 PROCEDURE — 258N000003 HC RX IP 258 OP 636: Performed by: EMERGENCY MEDICINE

## 2020-12-12 PROCEDURE — 85025 COMPLETE CBC W/AUTO DIFF WBC: CPT | Performed by: EMERGENCY MEDICINE

## 2020-12-12 PROCEDURE — 250N000011 HC RX IP 250 OP 636: Performed by: EMERGENCY MEDICINE

## 2020-12-12 PROCEDURE — 84703 CHORIONIC GONADOTROPIN ASSAY: CPT | Performed by: EMERGENCY MEDICINE

## 2020-12-12 PROCEDURE — 96375 TX/PRO/DX INJ NEW DRUG ADDON: CPT

## 2020-12-12 PROCEDURE — 96361 HYDRATE IV INFUSION ADD-ON: CPT

## 2020-12-12 RX ORDER — METOCLOPRAMIDE HYDROCHLORIDE 5 MG/ML
10 INJECTION INTRAMUSCULAR; INTRAVENOUS ONCE
Status: COMPLETED | OUTPATIENT
Start: 2020-12-12 | End: 2020-12-12

## 2020-12-12 RX ORDER — KETOROLAC TROMETHAMINE 15 MG/ML
15 INJECTION, SOLUTION INTRAMUSCULAR; INTRAVENOUS ONCE
Status: COMPLETED | OUTPATIENT
Start: 2020-12-12 | End: 2020-12-12

## 2020-12-12 RX ORDER — SODIUM CHLORIDE 9 MG/ML
INJECTION, SOLUTION INTRAVENOUS
Status: DISCONTINUED
Start: 2020-12-12 | End: 2020-12-12 | Stop reason: HOSPADM

## 2020-12-12 RX ORDER — DIPHENHYDRAMINE HYDROCHLORIDE 50 MG/ML
25 INJECTION INTRAMUSCULAR; INTRAVENOUS ONCE
Status: COMPLETED | OUTPATIENT
Start: 2020-12-12 | End: 2020-12-12

## 2020-12-12 RX ADMIN — DIPHENHYDRAMINE HYDROCHLORIDE 25 MG: 50 INJECTION, SOLUTION INTRAMUSCULAR; INTRAVENOUS at 13:00

## 2020-12-12 RX ADMIN — KETOROLAC TROMETHAMINE 15 MG: 15 INJECTION, SOLUTION INTRAMUSCULAR; INTRAVENOUS at 13:04

## 2020-12-12 RX ADMIN — METOCLOPRAMIDE HYDROCHLORIDE 10 MG: 5 INJECTION INTRAMUSCULAR; INTRAVENOUS at 13:07

## 2020-12-12 RX ADMIN — SODIUM CHLORIDE 1000 ML: 9 INJECTION, SOLUTION INTRAVENOUS at 12:59

## 2020-12-12 ASSESSMENT — ENCOUNTER SYMPTOMS
NAUSEA: 1
VOMITING: 1
HEADACHES: 1

## 2020-12-12 NOTE — ANESTHESIA PREPROCEDURE EVALUATION
"Anesthesia Pre-Procedure Evaluation    Patient: Lydia Rockwell   MRN:     [id number] Gender:   female   Age:    39 year old :      1980        Preoperative Diagnosis: Knee pain [M25.569]   Procedure(s):  Right Knee Arthroscopy,  Tibial Plateu debridement,  microfracture,  Biocartlidge right knee partial Meniscectomy, chrondroplasty,  allogenic chondrocyte implantation     LABS:  CBC:   Lab Results   Component Value Date    HGB 12.0 2020     BMP:   Lab Results   Component Value Date    GLC 86 2020     COAGS: No results found for: PTT, INR, FIBR  POC:   Lab Results   Component Value Date    HCG Negative 2020     OTHER: No results found for: PH, LACT, A1C, MIKI, PHOS, MAG, ALBUMIN, PROTTOTAL, ALT, AST, GGT, ALKPHOS, BILITOTAL, BILIDIRECT, LIPASE, AMYLASE, JACOB, TSH, T4, T3, CRP, SED     Preop Vitals    BP Readings from Last 3 Encounters:   20 116/77   20 121/64    Pulse Readings from Last 3 Encounters:   20 57   20 60      Resp Readings from Last 3 Encounters:   20 11    SpO2 Readings from Last 3 Encounters:   20 100%   20 98%      Temp Readings from Last 1 Encounters:   20 36.9  C (98.5  F) (Oral)    Ht Readings from Last 1 Encounters:   20 1.702 m (5' 7\")      Wt Readings from Last 1 Encounters:   20 84 kg (185 lb 3 oz)    Estimated body mass index is 29 kg/m  as calculated from the following:    Height as of 20: 1.702 m (5' 7\").    Weight as of 20: 84 kg (185 lb 3 oz).     LDA:        Past Medical History:   Diagnosis Date     Amblyopia      Depression      Melanoma of skin (H)      PONV (postoperative nausea and vomiting)       Past Surgical History:   Procedure Laterality Date     APPENDECTOMY       ARTHROSCOPY KNEE       ARTHROSCOPY KNEE Right 2020    Procedure: Right knee arthroscopy, partial medial meniscectomy;  Surgeon: Jarad Matson MD;  Location: [facility] OR     ARTHROTOMY KNEE Right 2020    " Procedure: Right tibial plateu microfracture,  Biocartlidge allogenic chondrocyte implantation;  Surgeon: Jarad Matson MD;  Location: [facility] OR     CHOLECYSTECTOMY       EYE SURGERY Right     melanoma removed from right eye     wisdom teeth extraction        Allergies   Allergen Reactions     Codeine Nausea and Vomiting        Anesthesia Evaluation     . Pt has had prior anesthetic. Type: General, MAC and Regional    History of anesthetic complications   - PONV        ROS/MED HX    ENT/Pulmonary:  - neg pulmonary ROS     Neurologic:  - neg neurologic ROS     Cardiovascular:  - neg cardiovascular ROS       METS/Exercise Tolerance:  >4 METS   Hematologic:  - neg hematologic  ROS       Musculoskeletal:         GI/Hepatic:  - neg GI/hepatic ROS       Renal/Genitourinary:  - ROS Renal section negative       Endo:         Psychiatric:         Infectious Disease:         Malignancy:         Other:                         PHYSICAL EXAM:   Mental Status/Neuro: A/A/O   Airway: Facies: Feasible  Mallampati: I  Mouth/Opening: Full  TM distance: > 6 cm  Neck ROM: Full   Respiratory: Auscultation: CTAB     Resp. Rate: Normal     Resp. Effort: Normal      CV: Rhythm: Regular  Rate: Age appropriate  Heart: Normal Sounds  Edema: None   Comments:      Dental: Normal Dentition                Assessment:   ASA SCORE: 1    H&P: History and physical reviewed and following examination; no interval change.         Plan:   Anes. Type:  Epidural     Epidural Details:  Single Shot (blood patch)      Induction:  N/a              CONSENT: Direct conversation   Plan and risks discussed with: Patient          Comments for Plan/Consent:  Spinal Wednesday, positional headache developed Thursday AM. Different from typical migraines. Associated with nausea and photophobia. Wishes to have blood patch.                      Jeferson Hylton MD

## 2020-12-12 NOTE — TELEPHONE ENCOUNTER
Was in on Wednesday for a knee surgery and was attempted to have a block, but it didn't work and had to have general anesthesia.      For the past 2 days, she has been experiencing excruciating headaches, can only lay flat in the bed, as anytime she gets up she is also extremely dizzy.  Has been this way for 2 days.  She can't really even get out of bed to go to the bathroom.      Advised patient that she would need to be seen in the ER.  Since she can't really get out of bed safely and due to recent surgery, advised to call 911 to transport to the ER.    She was very frustrated and teary, reported that someone told her they would be calling to check on her Thursday or Friday, but she never got a call.    She does have someone in the house to help her call 911.    Chel Laboy RN on 12/12/2020 at 8:56 AM      Reason for Disposition    Sounds like a life-threatening emergency to the triager    Additional Information    Negative: Heart beating < 50 beats per minute OR > 140 beats per minute    Negative: [1] Fainted > 15 minutes ago AND [2] still feels too weak or dizzy to stand    Negative: Overdose (accidental or intentional) of medications    Negative: [1] Loss of speech or garbled speech AND [2] sudden onset AND [3] present now    Negative: [1] Numbness (i.e., loss of sensation) of the face, arm or leg on one side of the body AND [2] sudden onset AND [3] present now    Negative: [1] Weakness (i.e., paralysis, loss of muscle strength) of the face, arm or leg on one side of the body AND [2] sudden onset AND [3] present now    Negative: Difficult to awaken or acting confused (e.g., disoriented, slurred speech)    Negative: Shock suspected (e.g., cold/pale/clammy skin, too weak to stand, low BP, rapid pulse)    Negative: [1] Difficulty breathing or swallowing AND [2] started suddenly after medicine, an allergic food or bee sting    Negative: Severe difficulty breathing (e.g., struggling for each breath, speaks in  single words)    Protocols used: DIZZINESS - [id number]

## 2020-12-12 NOTE — DISCHARGE INSTRUCTIONS
At home tonight rest lay in a more recumbent position as often as possible.      Stay well-hydrated by drinking plenty of fluids.  Caffeinated beverages may help also.    If still having problematic symptoms tomorrow afternoon, return to the emergency department to be reevaluated by anesthesia for repeat blood patch procedure.

## 2020-12-12 NOTE — ED NOTES
"     Emergency Department Patient Sign-out       Brief HPI and ED course:  Patient is a 40 year old female signed out to me by Dr. Mckeon.  See initial ED Provider note for details of the presentation.     Vitals:   Patient Vitals for the past 24 hrs:   BP Temp Temp src Pulse Resp SpO2   12/12/20 1741 -- -- -- -- 14 99 %   12/12/20 1700 116/79 -- -- 79 -- 100 %   12/12/20 1400 108/70 -- -- 62 -- 99 %   12/12/20 1345 109/66 -- -- 62 -- 100 %   12/12/20 1335 114/80 -- -- 97 -- 100 %   12/12/20 1233 116/77 98.5  F (36.9  C) Oral 57 -- 100 %         Plan:   -Ambulate after period of laying flat to assess for return of symptoms.    Events after assuming care:  After care was assumed, a focused history and physical was performed. Agree with findings relayed by previous provider.     After period of time, patient was placed in a more upright position and at approximately 45 degrees reported return of headache.  She reported that it was not as severe as before stating that it seemed like \"aftereffects\" of the headache.  She was able to eat and drink without difficulty.  I did speak with anesthesia who recommended patient stay well-hydrated and rest tonight at home and return tomorrow after 12 noon if still having symptoms for reevaluation and consideration of repeat blood patch procedure.  This was discussed with the patient and she was discharged in good condition.     --  Erik Perez MD   Emergency Medicine         Erik Perez MD  12/12/20 9347    "

## 2020-12-12 NOTE — ANESTHESIA PROCEDURE NOTES
Blood Patch:      Staff -   Anesthesiologist:  Jeferson Hylton MD  Performed By: anesthesiologistPatient location during procedure: ED  Preanesthetic Checklist:  Completed: patient identified, pre-op evaluation, timeout performed, IV checked, risks and benefits discussed, monitors and equipment checked and anesthesia consent given  Procedure Information:  Location of venous blood draw: arm  Volume of blood injected: 20 mL      Patient position: sitting  Prep: Chloraprep and site prepped and draped  Prep:gloves, mask and washed/disinfected hands  Patient monitoring: continuous pulse oximetry, blood pressure monitoring and EKG  Approach: midline  Injection technique: VIRGIL saline  Location: L3-4  Injection method: Touhy needle  Needle gauge: 17 G  Needle length: 9 cm cm  Loss of resistance depth: 7 cm  Catheter type: none        Assessment:  Events: well tolerated  Reason for Blood Patch: spinal headache

## 2020-12-12 NOTE — ED NOTES
Bed: ED19  Expected date: 12/12/20  Expected time: 12:00 PM  Means of arrival: Ambulance  Comments:  [address], 39yo female, nausea and headache 3 days post epidural

## 2020-12-12 NOTE — ED PROVIDER NOTES
[facility] EMERGENCY DEPARTMENT ([facility])     December 12, 2020      ED Provider Note  [facility]      History     Chief Complaint   Patient presents with     Post-op Problem     pt reports severe pain with nausea and vomiting post op day three Rt Knee surgery      The history is provided by the patient and medical records.     Lydia Rockwell is a 40 year old female with a past medical history significant for who presents to the ED via EMS for a postoperative problem.  Per the patient's medical record, on 12/9/2020, the patient underwent a right knee arthroscopy and partial medial meniscectomy.  Initial plan was to place an epidural catheter which was performed but did not achieve anesthesia.  They subsequently placed her under general anesthesia without complication.  The patient was given Zofran and oxycodone for symptomatic relief after the procedure.  The patient reports that the following evening she developed a severe headache with nausea, vomiting, and photophobia.  She states that her symptoms are worse with moving and sitting up.  Times unchanged with Tylenol, ibuprofen, and oxycodone.  She denies any focal neurologic deficits.  She does have some pain in her low back that is nonradiating.    Past Medical History  Past Medical History:   Diagnosis Date     Amblyopia      Depression      Melanoma of skin (H)      PONV (postoperative nausea and vomiting)      Past Surgical History:   Procedure Laterality Date     APPENDECTOMY       ARTHROSCOPY KNEE       ARTHROSCOPY KNEE Right 12/9/2020    Procedure: Right knee arthroscopy, partial medial meniscectomy;  Surgeon: Jarad Matson MD;  Location: [facility] OR     ARTHROTOMY KNEE Right 12/9/2020    Procedure: Right tibial plateu microfracture,  Biocartlidge allogenic chondrocyte implantation;  Surgeon: Jarad Matson MD;  Location: [facility] OR     CHOLECYSTECTOMY       EYE SURGERY Right     melanoma removed from  right eye     wisdom teeth extraction            acetaminophen (TYLENOL) 325 MG tablet       citalopram (CELEXA) 20 MG tablet       fluticasone (FLONASE) 50 MCG/ACT nasal spray       hydrOXYzine (ATARAX) 25 MG tablet       ibuprofen (ADVIL/MOTRIN) 200 MG tablet       ibuprofen (ADVIL/MOTRIN) 600 MG tablet       Melatonin 2.5 MG CHEW       ondansetron (ZOFRAN-ODT) 4 MG ODT tab       oxyCODONE (ROXICODONE) 5 MG tablet       polyethylene glycol (MIRALAX) 17 g packet       senna-docusate (SENOKOT-S/PERICOLACE) 8.6-50 MG tablet      Allergies   Allergen Reactions     Codeine Nausea and Vomiting     Family History  No family history on file.  Social History   Social History     Tobacco Use     Smoking status: Former Smoker     Quit date: 10/13/2009     Years since quittin.1     Smokeless tobacco: Never Used   Substance Use Topics     Alcohol use: Yes     Comment: 5 glasses of wine/week     Drug use: Never      Past medical history, past surgical history, medications, allergies, family history, and social history were reviewed with the patient. No additional pertinent items.       Review of Systems   Gastrointestinal: Positive for nausea and vomiting.   Neurological: Positive for headaches.     A complete review of systems was performed with pertinent positives and negatives noted in the HPI, and all other systems negative.    Physical Exam   BP: 116/77  Pulse: 57  Temp: 98.5  F (36.9  C)  SpO2: 100 %  Physical Exam  Vitals signs and nursing note reviewed.   Constitutional:       General: She is in acute distress (appears uncomfortable).      Appearance: Normal appearance. She is not diaphoretic.   HENT:      Head: Normocephalic and atraumatic.      Mouth/Throat:      Pharynx: No oropharyngeal exudate.   Eyes:      General: No scleral icterus.     Pupils: Pupils are equal, round, and reactive to light.   Neck:      Musculoskeletal: Neck supple.   Cardiovascular:      Rate and Rhythm: Normal rate and regular rhythm.       Pulses: Normal pulses.      Heart sounds: Normal heart sounds.   Pulmonary:      Effort: Pulmonary effort is normal. No respiratory distress.      Breath sounds: Normal breath sounds.   Abdominal:      General: Bowel sounds are normal.      Palpations: Abdomen is soft.      Tenderness: There is no abdominal tenderness.   Musculoskeletal:         General: No tenderness.      Lumbar back: She exhibits no bony tenderness.      Right lower leg: No edema.      Left lower leg: No edema.   Skin:     General: Skin is warm.      Findings: No rash.   Neurological:      General: No focal deficit present.      Mental Status: She is alert and oriented to person, place, and time.   Psychiatric:         Mood and Affect: Mood normal.         Behavior: Behavior normal.         ED Course      Procedures       Results for orders placed or performed during the hospital encounter of 12/12/20   CBC with platelets differential     Status: None   Result Value Ref Range    WBC 5.6 4.0 - 11.0 10e9/L    RBC Count 4.29 3.8 - 5.2 10e12/L    Hemoglobin 12.0 11.7 - 15.7 g/dL    Hematocrit 36.6 35.0 - 47.0 %    MCV 85 78 - 100 fl    MCH 28.0 26.5 - 33.0 pg    MCHC 32.8 31.5 - 36.5 g/dL    RDW 13.1 10.0 - 15.0 %    Platelet Count 280 150 - 450 10e9/L    Diff Method Automated Method     % Neutrophils 53.5 %    % Lymphocytes 30.8 %    % Monocytes 6.4 %    % Eosinophils 8.2 %    % Basophils 0.9 %    % Immature Granulocytes 0.2 %    Nucleated RBCs 0 0 /100    Absolute Neutrophil 3.0 1.6 - 8.3 10e9/L    Absolute Lymphocytes 1.7 0.8 - 5.3 10e9/L    Absolute Monocytes 0.4 0.0 - 1.3 10e9/L    Absolute Eosinophils 0.5 0.0 - 0.7 10e9/L    Absolute Basophils 0.1 0.0 - 0.2 10e9/L    Abs Immature Granulocytes 0.0 0 - 0.4 10e9/L    Absolute Nucleated RBC 0.0    Basic metabolic panel     Status: Abnormal   Result Value Ref Range    Sodium 142 133 - 144 mmol/L    Potassium 3.6 3.4 - 5.3 mmol/L    Chloride 111 (H) 94 - 109 mmol/L    Carbon Dioxide 26 20 - 32  mmol/L    Anion Gap 5 3 - 14 mmol/L    Glucose 93 70 - 99 mg/dL    Urea Nitrogen 8 7 - 30 mg/dL    Creatinine 0.71 0.52 - 1.04 mg/dL    GFR Estimate >90 >60 mL/min/[1.73_m2]    GFR Estimate If Black >90 >60 mL/min/[1.73_m2]    Calcium 8.3 (L) 8.5 - 10.1 mg/dL   HCG qualitative Blood     Status: None   Result Value Ref Range    HCG Qualitative Serum Negative NEG^Negative   Results for orders placed or performed in visit on 12/12/20   Blood Patch     Status: None    Narrative    Jeferson Hylton MD     12/12/2020  1:53 PM  Blood Patch:      Staff -   Anesthesiologist:  Jeferson Hylton MD  Performed By: anesthesiologistPatient location during procedure: ED  Preanesthetic Checklist:  Completed: patient identified, pre-op evaluation, timeout performed, IV   checked, risks and benefits discussed, monitors and equipment checked and   anesthesia consent given  Procedure Information:  Location of venous blood draw: arm  Volume of blood injected: 20 mL      Patient position: sitting  Prep: Chloraprep and site prepped and draped  Prep:gloves, mask and washed/disinfected hands  Patient monitoring: continuous pulse oximetry, blood pressure monitoring   and EKG  Approach: midline  Injection technique: VIRGIL saline  Location: L3-4  Injection method: Touhy needle  Needle gauge: 17 G  Needle length: 9 cm cm  Loss of resistance depth: 7 cm  Catheter type: none        Assessment:  Events: well tolerated  Reason for Blood Patch: spinal headache       Medications   sodium chloride 0.9 % infusion (  Canceled Entry 12/12/20 1309)   ketorolac (TORADOL) injection 15 mg (15 mg Intravenous Given 12/12/20 1304)   metoclopramide (REGLAN) injection 10 mg (10 mg Intravenous Given 12/12/20 1307)   diphenhydrAMINE (BENADRYL) injection 25 mg (25 mg Intravenous Given 12/12/20 1300)   0.9% sodium chloride BOLUS (1,000 mLs Intravenous New Bag 12/12/20 1259)        Assessments & Plan (with Medical Decision Making)   Patient was seen and  examined.  Findings seem most consistent with a post-dural puncture headache although migraine headache is certainly in the differential.  I have a low suspicion for infectious process.  Labs show no acute abnormalities.  IV fluids, Toradol, Reglan, and Benadryl were given with only minimal improvement.  I discussed the case with anesthesiology who was in agreement on placing a blood patch.  Patient was observed in the emergency department for several hours after this and had significant improvement in her headache.  She will be observed for a full 2 hours laying flat and then trial sitting up and ambulating.  Patient was signed out to evening provider but will likely be stable for discharge.    I have reviewed the nursing notes. I have reviewed the findings, diagnosis, plan and need for follow up with the patient.    New Prescriptions    No medications on file       Final diagnoses:   Post-dural puncture headache       --  Ellen Mckeon DO  [facility] EMERGENCY DEPARTMENT  12/12/2020     Ellen Mckeon DO  12/12/20 1543

## 2020-12-12 NOTE — ED AVS SNAPSHOT
[facility] Emergency Department  [address]  [place] [address]  Phone: [phone]  Fax: [phone]                                    Lydia Rockwell   MRN: [id number]    Department: [facility] Emergency Department   Date of Visit: 12/12/2020           After Visit Summary Signature Page    I have received my discharge instructions, and my questions have been answered. I have discussed any challenges I see with this plan with the nurse or doctor.    ..........................................................................................................................................  Patient/Patient Representative Signature      ..........................................................................................................................................  Patient Representative Print Name and Relationship to Patient    ..................................................               ................................................  Date                                   Time    ..........................................................................................................................................  Reviewed by Signature/Title    ...................................................              ..............................................  Date                                               Time          22EPIC Rev 08/18

## 2020-12-12 NOTE — ED NOTES
Pt was in high fowlers position for approx 30mins, but now reported increase in headache, positioned in semi fowlers and will reassess. Pt denies nausea. MD updated

## 2020-12-12 NOTE — ED TRIAGE NOTES
Pt had Rt knee surgery on Wednesday under general anesthesia after epidural did not work. Pt now reports severe headache, nausea and vomiting especially if she moves per pt. Pt also reports taking ibuprofen and tylenol and does not like narcotic analgesics. Pt received 4mgs of zofran in ambulance with some relief but continues to have headache and feeling nauseaus.

## 2020-12-15 NOTE — PROGRESS NOTES
Telephone contact:     Patient complaining of dull headache which is slightly worse with standing. She also c/o dizziness which prevented her from showering today. She says recent blood patch helped her headache by ~70%. ROS is otherwise negative. Advised patient to lie flat, stay hydrated and consume caffeine. If she has severe pain, or persistent neurologic symptoms, she was advised to seek urgent medical attention.

## 2020-12-18 ENCOUNTER — ANESTHESIA EVENT (OUTPATIENT)
Dept: EMERGENCY MEDICINE | Facility: CLINIC | Age: 40
End: 2020-12-18
Payer: COMMERCIAL

## 2020-12-18 ENCOUNTER — HOSPITAL ENCOUNTER (EMERGENCY)
Facility: CLINIC | Age: 40
Discharge: HOME | End: 2020-12-19
Attending: FAMILY MEDICINE | Admitting: FAMILY MEDICINE
Payer: COMMERCIAL

## 2020-12-18 ENCOUNTER — ANESTHESIA (OUTPATIENT)
Dept: EMERGENCY MEDICINE | Facility: CLINIC | Age: 40
End: 2020-12-18
Payer: COMMERCIAL

## 2020-12-18 ENCOUNTER — APPOINTMENT (OUTPATIENT)
Dept: CT IMAGING | Facility: CLINIC | Age: 40
End: 2020-12-18
Attending: FAMILY MEDICINE
Payer: COMMERCIAL

## 2020-12-18 DIAGNOSIS — G97.1 POST-DURAL PUNCTURE HEADACHE: ICD-10-CM

## 2020-12-18 LAB
ALBUMIN SERPL-MCNC: 3.8 G/DL (ref 3.4–5)
ALP SERPL-CCNC: 45 U/L (ref 40–150)
ALT SERPL W P-5'-P-CCNC: 28 U/L (ref 0–50)
ANION GAP SERPL CALCULATED.3IONS-SCNC: 3 MMOL/L (ref 3–14)
AST SERPL W P-5'-P-CCNC: 21 U/L (ref 0–45)
BASOPHILS # BLD AUTO: 0.1 10E9/L (ref 0–0.2)
BASOPHILS NFR BLD AUTO: 1.6 %
BILIRUB SERPL-MCNC: 0.3 MG/DL (ref 0.2–1.3)
BUN SERPL-MCNC: 10 MG/DL (ref 7–30)
CALCIUM SERPL-MCNC: 8.7 MG/DL (ref 8.5–10.1)
CHLORIDE SERPL-SCNC: 109 MMOL/L (ref 94–109)
CO2 SERPL-SCNC: 29 MMOL/L (ref 20–32)
CREAT SERPL-MCNC: 0.69 MG/DL (ref 0.52–1.04)
DIFFERENTIAL METHOD BLD: ABNORMAL
EOSINOPHIL # BLD AUTO: 0.4 10E9/L (ref 0–0.7)
EOSINOPHIL NFR BLD AUTO: 6.9 %
ERYTHROCYTE [DISTWIDTH] IN BLOOD BY AUTOMATED COUNT: 13.3 % (ref 10–15)
GFR SERPL CREATININE-BSD FRML MDRD: >90 ML/MIN/{1.73_M2}
GLUCOSE SERPL-MCNC: 90 MG/DL (ref 70–99)
HCT VFR BLD AUTO: 35.7 % (ref 35–47)
HGB BLD-MCNC: 11.4 G/DL (ref 11.7–15.7)
IMM GRANULOCYTES # BLD: 0 10E9/L (ref 0–0.4)
IMM GRANULOCYTES NFR BLD: 0 %
LYMPHOCYTES # BLD AUTO: 2.1 10E9/L (ref 0.8–5.3)
LYMPHOCYTES NFR BLD AUTO: 41.1 %
MCH RBC QN AUTO: 27.7 PG (ref 26.5–33)
MCHC RBC AUTO-ENTMCNC: 31.9 G/DL (ref 31.5–36.5)
MCV RBC AUTO: 87 FL (ref 78–100)
MONOCYTES # BLD AUTO: 0.5 10E9/L (ref 0–1.3)
MONOCYTES NFR BLD AUTO: 9.4 %
NEUTROPHILS # BLD AUTO: 2.1 10E9/L (ref 1.6–8.3)
NEUTROPHILS NFR BLD AUTO: 41 %
NRBC # BLD AUTO: 0 10*3/UL
NRBC BLD AUTO-RTO: 0 /100
PLATELET # BLD AUTO: 275 10E9/L (ref 150–450)
POTASSIUM SERPL-SCNC: 4.1 MMOL/L (ref 3.4–5.3)
PROT SERPL-MCNC: 6.9 G/DL (ref 6.8–8.8)
RBC # BLD AUTO: 4.12 10E12/L (ref 3.8–5.2)
SODIUM SERPL-SCNC: 141 MMOL/L (ref 133–144)
WBC # BLD AUTO: 5.1 10E9/L (ref 4–11)

## 2020-12-18 PROCEDURE — 250N000009 HC RX 250: Performed by: STUDENT IN AN ORGANIZED HEALTH CARE EDUCATION/TRAINING PROGRAM

## 2020-12-18 PROCEDURE — 258N000003 HC RX IP 258 OP 636: Performed by: FAMILY MEDICINE

## 2020-12-18 PROCEDURE — 96365 THER/PROPH/DIAG IV INF INIT: CPT

## 2020-12-18 PROCEDURE — 85025 COMPLETE CBC W/AUTO DIFF WBC: CPT | Performed by: FAMILY MEDICINE

## 2020-12-18 PROCEDURE — 99285 EMERGENCY DEPT VISIT HI MDM: CPT | Performed by: FAMILY MEDICINE

## 2020-12-18 PROCEDURE — 80053 COMPREHEN METABOLIC PANEL: CPT | Performed by: FAMILY MEDICINE

## 2020-12-18 PROCEDURE — 96361 HYDRATE IV INFUSION ADD-ON: CPT

## 2020-12-18 PROCEDURE — 70450 CT HEAD/BRAIN W/O DYE: CPT

## 2020-12-18 PROCEDURE — 99285 EMERGENCY DEPT VISIT HI MDM: CPT | Mod: 25

## 2020-12-18 PROCEDURE — 250N000009 HC RX 250: Performed by: FAMILY MEDICINE

## 2020-12-18 PROCEDURE — 250N000011 HC RX IP 250 OP 636: Performed by: FAMILY MEDICINE

## 2020-12-18 PROCEDURE — 96375 TX/PRO/DX INJ NEW DRUG ADDON: CPT

## 2020-12-18 RX ORDER — KETOROLAC TROMETHAMINE 15 MG/ML
15 INJECTION, SOLUTION INTRAMUSCULAR; INTRAVENOUS ONCE
Status: COMPLETED | OUTPATIENT
Start: 2020-12-18 | End: 2020-12-18

## 2020-12-18 RX ORDER — CAFFEINE CITRATE 20 MG/ML
500 SOLUTION INTRAVENOUS DAILY
Status: DISCONTINUED | OUTPATIENT
Start: 2020-12-18 | End: 2020-12-18

## 2020-12-18 RX ORDER — ONDANSETRON 2 MG/ML
4 INJECTION INTRAMUSCULAR; INTRAVENOUS ONCE
Status: COMPLETED | OUTPATIENT
Start: 2020-12-18 | End: 2020-12-18

## 2020-12-18 RX ORDER — CAFFEINE AND SODIUM BENZOATE 125 MG/ML
500 INJECTION, SOLUTION INTRAMUSCULAR; INTRAVENOUS ONCE
Status: DISCONTINUED | OUTPATIENT
Start: 2020-12-18 | End: 2020-12-18

## 2020-12-18 RX ORDER — LIDOCAINE HYDROCHLORIDE 10 MG/ML
INJECTION, SOLUTION INFILTRATION; PERINEURAL PRN
Status: DISCONTINUED | OUTPATIENT
Start: 2020-12-18 | End: 2020-12-18 | Stop reason: HOSPADM

## 2020-12-18 RX ORDER — HYDROMORPHONE HCL IN WATER/PF 6 MG/30 ML
0.2 PATIENT CONTROLLED ANALGESIA SYRINGE INTRAVENOUS ONCE
Status: COMPLETED | OUTPATIENT
Start: 2020-12-18 | End: 2020-12-18

## 2020-12-18 RX ADMIN — LIDOCAINE HYDROCHLORIDE 3 MG: 10 INJECTION, SOLUTION INFILTRATION; PERINEURAL at 23:04

## 2020-12-18 RX ADMIN — ONDANSETRON 4 MG: 2 INJECTION INTRAMUSCULAR; INTRAVENOUS at 18:01

## 2020-12-18 RX ADMIN — SODIUM CHLORIDE 1000 ML: 9 INJECTION, SOLUTION INTRAVENOUS at 18:00

## 2020-12-18 RX ADMIN — HYDROMORPHONE HYDROCHLORIDE 0.2 MG: 0.2 INJECTION, SOLUTION INTRAMUSCULAR; INTRAVENOUS; SUBCUTANEOUS at 18:05

## 2020-12-18 RX ADMIN — CAFFEINE AND SODIUM BENZOATE 500 MG: 125 INJECTION, SOLUTION INTRAMUSCULAR; INTRAVENOUS at 18:16

## 2020-12-18 RX ADMIN — KETOROLAC TROMETHAMINE 15 MG: 15 INJECTION, SOLUTION INTRAMUSCULAR; INTRAVENOUS at 18:08

## 2020-12-18 NOTE — ED AVS SNAPSHOT
[facility] Emergency Department  [address]  [place] [address]  Phone: [phone]  Fax: [phone]                                    Lydia Rockwell   MRN: [id number]    Department: [facility] Emergency Department   Date of Visit: 12/18/2020           After Visit Summary Signature Page    I have received my discharge instructions, and my questions have been answered. I have discussed any challenges I see with this plan with the nurse or doctor.    ..........................................................................................................................................  Patient/Patient Representative Signature      ..........................................................................................................................................  Patient Representative Print Name and Relationship to Patient    ..................................................               ................................................  Date                                   Time    ..........................................................................................................................................  Reviewed by Signature/Title    ...................................................              ..............................................  Date                                               Time          22EPIC Rev 08/18

## 2020-12-18 NOTE — ED NOTES
Patient presents to ED with c/o severe headache. Patient states she had right knee surgery on 12/9/20 and had spinal. Patient states that headache started day after surgery and she had a blood patch on Saturday here in there ED with mild relief. Patient states that headache became unbearable and she took Tylenol and Excedrin and meds she takes for Depression with mild relief. Patient verbalized she took Ibuprofen at noon today with no relief; Patient states headache is constant, severe and in frontal region. C/o stiffness to back and neck but denies all other symptoms. Resting in bed.

## 2020-12-18 NOTE — ED TRIAGE NOTES
Pt had an epidural for her knee surgery last week and has been having ongoing HA issues since.  Pt has had one blood patch and was better briefly and now its back.

## 2020-12-19 VITALS
DIASTOLIC BLOOD PRESSURE: 86 MMHG | OXYGEN SATURATION: 100 % | RESPIRATION RATE: 18 BRPM | SYSTOLIC BLOOD PRESSURE: 120 MMHG | TEMPERATURE: 98.2 F | HEART RATE: 72 BPM

## 2020-12-19 NOTE — ANESTHESIA PREPROCEDURE EVALUATION
"Anesthesia Pre-Procedure Evaluation    Patient: Lydia Rockwell   MRN:     [id number] Gender:   female   Age:    40 year old :      1980        Preoperative Diagnosis: * No pre-op diagnosis entered *   * No procedures listed *     LABS:  CBC:   Lab Results   Component Value Date    WBC 5.1 2020    WBC 5.6 2020    HGB 11.4 (L) 2020    HGB 12.0 2020    HCT 35.7 2020    HCT 36.6 2020     2020     2020     BMP:   Lab Results   Component Value Date     2020     2020    POTASSIUM 4.1 2020    POTASSIUM 3.6 2020    CHLORIDE 109 2020    CHLORIDE 111 (H) 2020    CO2 29 2020    CO2 26 2020    BUN 10 2020    BUN 8 2020    CR 0.69 2020    CR 0.71 2020    GLC 90 2020    GLC 93 2020     COAGS: No results found for: PTT, INR, FIBR  POC:   Lab Results   Component Value Date    HCG Negative 2020    HCGS Negative 2020     OTHER:   Lab Results   Component Value Date    MIKI 8.7 2020    ALBUMIN 3.8 2020    PROTTOTAL 6.9 2020    ALT 28 2020    AST 21 2020    ALKPHOS 45 2020    BILITOTAL 0.3 2020        Preop Vitals    BP Readings from Last 3 Encounters:   20 120/86   20 111/74   20 121/64    Pulse Readings from Last 3 Encounters:   20 72   20 82   20 60      Resp Readings from Last 3 Encounters:   20 18   20 14   20 11    SpO2 Readings from Last 3 Encounters:   20 100%   20 99%   20 98%      Temp Readings from Last 1 Encounters:   20 36.8  C (98.2  F)    Ht Readings from Last 1 Encounters:   20 1.702 m (5' 7\")      Wt Readings from Last 1 Encounters:   20 84 kg (185 lb 3 oz)    Estimated body mass index is 29 kg/m  as calculated from the following:    Height as of 20: 1.702 m (5' 7\").    Weight as of 20: 84 kg (185 lb " 3 oz).     LDA:  Peripheral IV 12/18/20 Left  (Active)   Site Assessment WDL 12/18/20 1754   Line Status Saline locked 12/18/20 1754   Dressing Intervention New dressing  12/18/20 1754   Phlebitis Scale 0-->no symptoms 12/18/20 1754   Infiltration Scale 0 12/18/20 1754   Infiltration Site Treatment Method  None 12/18/20 1754   Number of days: 0       Intrathecal/Epidural Catheter 12/12/20 (Active)   Number of days: 6        Past Medical History:   Diagnosis Date     Amblyopia      Depression      Melanoma of skin (H)      PONV (postoperative nausea and vomiting)       Past Surgical History:   Procedure Laterality Date     APPENDECTOMY       ARTHROSCOPY KNEE       ARTHROSCOPY KNEE Right 12/9/2020    Procedure: Right knee arthroscopy, partial medial meniscectomy;  Surgeon: Jarad Matson MD;  [facility]     ARTHROTOMY KNEE Right 12/9/2020    Procedure: Right tibial plateu microfracture,  Biocartlidge allogenic chondrocyte implantation;  Surgeon: Jarad Matson MD;  [facility] OR     CHOLECYSTECTOMY       EYE SURGERY Right     melanoma removed from right eye     wisdom teeth extraction        Allergies   Allergen Reactions     Codeine Nausea and Vomiting        Anesthesia Evaluation     . Pt has had prior anesthetic. Type: General, MAC and Regional    History of anesthetic complications   - PONV        ROS/MED HX    ENT/Pulmonary:  - neg pulmonary ROS     Neurologic:  - neg neurologic ROS     Cardiovascular:  - neg cardiovascular ROS       METS/Exercise Tolerance:  >4 METS   Hematologic:  - neg hematologic  ROS       Musculoskeletal:         GI/Hepatic:  - neg GI/hepatic ROS       Renal/Genitourinary:  - ROS Renal section negative       Endo:         Psychiatric:         Infectious Disease:         Malignancy:         Other:                         PHYSICAL EXAM:   Mental Status/Neuro: A/A/O   Airway: Facies: Feasible  Mallampati: I  Mouth/Opening: Full  TM distance: > 6 cm  Neck ROM: Full    Respiratory: Auscultation: CTAB     Resp. Rate: Normal     Resp. Effort: Normal      CV: Rhythm: Regular  Rate: Age appropriate  Heart: Normal Sounds  Edema: None   Comments:      Dental: Normal Dentition                Assessment:   ASA SCORE: 1            Plan:   Anes. Type:  Epidural     Epidural Details:  Single Shot (Blood patch)                    PONV Management:   Adult Risk Factors: Female, H/o PONV or Motion Sickness         Patient presented to ED with frontal headache. Received a blood patch on 12/12/20 which reduced the intensity of her headache by ~70 percent. Headache has been persistent but is not positional. She has tried conservative management (caffeine, ibuprofen, aspirin, fluid) without success. Patient desires an additional blood patch at this time. Risks/benefits explained to patient that include but not limited to bleeding, bruising, infection, permanent nerve damage, wet tap, and headache.           Esther Osborne MD

## 2020-12-19 NOTE — DISCHARGE INSTRUCTIONS
Thank you for choosing [facility].     Please closely monitor for further symptoms. Return to the Emergency Department if you develop any new or worsening signs or symptoms.    If you received any opiate pain medications or sedatives during your visit, please do not drive for at least 8 hours.     Labs, cultures or final xray interpretations may still need to be reviewed.  We will call you if your plan of care needs to be changed.    Please follow up with your primary care physician or clinic.

## 2020-12-19 NOTE — ED PROVIDER NOTES
ED Provider Note  [facility]      History     Chief Complaint   Patient presents with     Headache     HPI  Lydia Rockwell is a 40 year old female who presents with persistent frontal and to some extent postural headache.  Patient states she had an orthopedic procedure on December 9.  Following day she developed a frontal headache which was associated with photophobia and inability to stand up without significant exacerbation as well as nausea.  Was seen in the ED, treated symptomatically without relief and anesthesia was consulted due to the possibility of headache due to epidural leak (patient had spinal anesthesia for her orthopedic procedure).  A blood patch procedure was performed which she states did relieve her headache symptoms substantially, however not to the extent that she was completely comfortable.  She states she was advised to allow for a time of clinical improvement but over the last 6 days she continues to have frontal headaches which are worse when she is upright associated with nausea but no vomiting.  Does not have a fever or stiff neck.  Does not have any numbness, weakness, tingling, speech disturbance or difficulty with vision.  Has tried numerous over-the-counter analgesics without relief.    Past Medical History  Past Medical History:   Diagnosis Date     Amblyopia      Depression      Melanoma of skin (H)      PONV (postoperative nausea and vomiting)      Past Surgical History:   Procedure Laterality Date     APPENDECTOMY       ARTHROSCOPY KNEE       ARTHROSCOPY KNEE Right 12/9/2020    Procedure: Right knee arthroscopy, partial medial meniscectomy;  Surgeon: Jarad Matson MD;  Location: [facility] OR     ARTHROTOMY KNEE Right 12/9/2020    Procedure: Right tibial plateu microfracture,  Biocartlidge allogenic chondrocyte implantation;  Surgeon: Jarad Matson MD;  Location: [facility] OR     CHOLECYSTECTOMY       EYE SURGERY Right     melanoma removed from  right eye     wisdom teeth extraction            acetaminophen (TYLENOL) 325 MG tablet       citalopram (CELEXA) 20 MG tablet       fluticasone (FLONASE) 50 MCG/ACT nasal spray       hydrOXYzine (ATARAX) 25 MG tablet       ibuprofen (ADVIL/MOTRIN) 200 MG tablet       ibuprofen (ADVIL/MOTRIN) 600 MG tablet       Melatonin 2.5 MG CHEW       ondansetron (ZOFRAN-ODT) 4 MG ODT tab       oxyCODONE (ROXICODONE) 5 MG tablet       polyethylene glycol (MIRALAX) 17 g packet       senna-docusate (SENOKOT-S/PERICOLACE) 8.6-50 MG tablet      Allergies   Allergen Reactions     Codeine Nausea and Vomiting     Family History  No family history on file.  Social History   Social History     Tobacco Use     Smoking status: Former Smoker     Quit date: 10/13/2009     Years since quittin.1     Smokeless tobacco: Never Used   Substance Use Topics     Alcohol use: Yes     Comment: 5 glasses of wine/week     Drug use: Never      Past medical history, past surgical history, medications, allergies, family history, and social history were reviewed with the patient. No additional pertinent items.       Review of Systems  A complete review of systems was performed with pertinent positives and negatives noted in the HPI, and all other systems negative.    Physical Exam   BP: 136/70  Pulse: 79  Temp: 98  F (36.7  C)  Resp: 16  SpO2: 100 %  Physical Exam  Vitals signs and nursing note reviewed.   Constitutional:       General: She is not in acute distress.     Appearance: She is not diaphoretic.   HENT:      Head: Atraumatic.      Mouth/Throat:      Pharynx: No oropharyngeal exudate.   Eyes:      General: No scleral icterus.     Pupils: Pupils are equal, round, and reactive to light.   Neck:      Musculoskeletal: Neck supple.   Cardiovascular:      Heart sounds: Normal heart sounds.   Pulmonary:      Effort: No respiratory distress.      Breath sounds: Normal breath sounds.   Abdominal:      General: Bowel sounds are normal.      Palpations:  Abdomen is soft.      Tenderness: There is no abdominal tenderness.   Musculoskeletal:         General: No tenderness.      Comments: Her lumbar spine is not tender in the area where the procedure was performed appears to have healed normally   Skin:     General: Skin is warm.      Findings: No rash.   Neurological:      General: No focal deficit present.      Mental Status: She is alert and oriented to person, place, and time.      Cranial Nerves: Cranial nerves are intact.      Sensory: Sensation is intact.      Motor: Motor function is intact.      Coordination: Coordination is intact.      Gait: Gait is intact.         ED Course      Procedures                         Results for orders placed or performed during the hospital encounter of 12/18/20   CT Head w/o Contrast     Status: None    Narrative    CT SCAN OF THE HEAD WITHOUT CONTRAST   12/18/2020 8:17 PM     HISTORY: Persistent headache since surgery 9 days ago    TECHNIQUE:  Axial images of the head and coronal reformations without  IV contrast material. Radiation dose for this scan was reduced using  automated exposure control, adjustment of the mA and/or kV according  to patient size, or iterative reconstruction technique.    COMPARISON: None.    FINDINGS:  The cerebral hemispheres, brainstem, and cerebellum demonstrate normal  morphology and attenuation. No evidence of acute ischemia, hemorrhage,  mass, mass effect or hydrocephalus. The visualized calvarium, tympanic  cavities, mastoid cavities, and paranasal sinuses are unremarkable.      Impression    IMPRESSION:   No acute intracranial abnormality.    PETROS GIL MD   Comprehensive metabolic panel     Status: None   Result Value Ref Range    Sodium 141 133 - 144 mmol/L    Potassium 4.1 3.4 - 5.3 mmol/L    Chloride 109 94 - 109 mmol/L    Carbon Dioxide 29 20 - 32 mmol/L    Anion Gap 3 3 - 14 mmol/L    Glucose 90 70 - 99 mg/dL    Urea Nitrogen 10 7 - 30 mg/dL    Creatinine 0.69 0.52 - 1.04 mg/dL     GFR Estimate >90 >60 mL/min/[1.73_m2]    GFR Estimate If Black >90 >60 mL/min/[1.73_m2]    Calcium 8.7 8.5 - 10.1 mg/dL    Bilirubin Total 0.3 0.2 - 1.3 mg/dL    Albumin 3.8 3.4 - 5.0 g/dL    Protein Total 6.9 6.8 - 8.8 g/dL    Alkaline Phosphatase 45 40 - 150 U/L    ALT 28 0 - 50 U/L    AST 21 0 - 45 U/L   CBC with platelets differential     Status: Abnormal   Result Value Ref Range    WBC 5.1 4.0 - 11.0 10e9/L    RBC Count 4.12 3.8 - 5.2 10e12/L    Hemoglobin 11.4 (L) 11.7 - 15.7 g/dL    Hematocrit 35.7 35.0 - 47.0 %    MCV 87 78 - 100 fl    MCH 27.7 26.5 - 33.0 pg    MCHC 31.9 31.5 - 36.5 g/dL    RDW 13.3 10.0 - 15.0 %    Platelet Count 275 150 - 450 10e9/L    Diff Method Automated Method     % Neutrophils 41.0 %    % Lymphocytes 41.1 %    % Monocytes 9.4 %    % Eosinophils 6.9 %    % Basophils 1.6 %    % Immature Granulocytes 0.0 %    Nucleated RBCs 0 0 /100    Absolute Neutrophil 2.1 1.6 - 8.3 10e9/L    Absolute Lymphocytes 2.1 0.8 - 5.3 10e9/L    Absolute Monocytes 0.5 0.0 - 1.3 10e9/L    Absolute Eosinophils 0.4 0.0 - 0.7 10e9/L    Absolute Basophils 0.1 0.0 - 0.2 10e9/L    Abs Immature Granulocytes 0.0 0 - 0.4 10e9/L    Absolute Nucleated RBC 0.0      Medications   0.9% sodium chloride BOLUS (0 mLs Intravenous Stopped 12/18/20 2051)   ketorolac (TORADOL) injection 15 mg (15 mg Intravenous Given 12/18/20 1808)   HYDROmorphone (DILAUDID) injection 0.2 mg (0.2 mg Intravenous Given 12/18/20 1805)   ondansetron (ZOFRAN) injection 4 mg (4 mg Intravenous Given 12/18/20 1801)   caffeine-sodium benzoate 500 mg in sodium chloride 0.9 % 500 mL intermittent infusion (0 mg Intravenous Stopped 12/18/20 1920)        Assessments & Plan (with Medical Decision Making)   40-year-old female who has been experiencing headaches since shortly after a surgical procedure 9 days ago when she had an epidural anesthetic.  Was seen previously and diagnosed with what is likely a spinal headache and treated with a blood patch with  moderate transient relief but now is presenting with 6 days of ongoing persistent symptoms.  My differential diagnosis does include recurrent headaches due to epidural leak, given absence of fever or systemic symptoms I feel meningitis, or infectious encephalitis is less likely.  History does not support subarachnoid hemorrhage or vascular catastrophe.  While neoplasm is in the differential diagnosis the timing of onset shortly after epidural anesthesia makes this much less likely.  There is no history to support carbon monoxide poisoning, temporal arteritis, or other vasculitis of the etiology of her headache.  There is no associated trauma.  Her exam reveals normal vitals and a normal neurologic exam and a supple neck.  Is otherwise unremarkable.  Her labs are normal and imaging of her head does not show any alternative etiology of her headache.  Clinically, her symptoms are most consistent with post dural puncture headache.  Anesthesia was consulted after she was treated with IV fluids, IV caffeine, Toradol, and a small dose of Dilaudid.  Her headache was substantially improved but still persistent and she wished to proceed with a blood patch.  That procedure was performed without difficulty and she reported remarkable relief of her symptoms and is now is feeling much better and would like to go home.  I see no indication that there is a different process which is likely to be the cause of her symptoms.  We discussed the indications for emergency department return and follow-up.  Stable for discharge.  I have reviewed the nursing notes. I have reviewed the findings, diagnosis, plan and need for follow up with the patient.    New Prescriptions    No medications on file       Final diagnoses:   Post-dural puncture headache       --  Aron Pichardo MD  [facility] EMERGENCY DEPARTMENT  12/18/2020     Aron Pichardo MD  12/19/20 0025

## 2020-12-19 NOTE — ANESTHESIA PROCEDURE NOTES
Blood Patch:      Staff -   Anesthesiologist:  Man Houston MD  Resident/Fellow: Esther Osborne MD  Performed By: residentPatient location during procedure: ED  Procedure Information:  Location of venous blood draw: arm (Right AC)    Volume of Blood Injected (free text) 18 ml.  Blood collected by assistant using sterile technique.  Patient position: sitting  Prep: Chloraprep and site prepped and draped  Prep:gloves, mask and washed/disinfected hands  Patient monitoring: continuous pulse oximetry  Approach: midline  Injection technique: VIRGIL saline (6 cm)  Location: L3-4  Injection method: Touhy needle  Needle gauge: 17 G  Needle length: 9 cm cm  Loss of resistance depth: 6 cm  Catheter type: none        Assessment:  Events: well tolerated  Reason for Blood Patch: spinal headache

## 2020-12-19 NOTE — ED NOTES
Anesthesiologist team in room to perform 'blood patch'; continuous pulse oximeter monitoring applied per MD request; providers denies need for vital signs assessment and cardiac monitoring. Patient sitting up in bed, awake, calm and A&O times 4. Providers at bedside.

## 2020-12-29 NOTE — ADDENDUM NOTE
Addendum  created 12/29/20 1225 by Man Houston MD    Attestation recorded in Intraprocedure, Flowsheet accepted, Intraprocedure Attestations deleted, Intraprocedure Attestations filed

## 2021-01-09 ENCOUNTER — HEALTH MAINTENANCE LETTER (OUTPATIENT)
Age: 41
End: 2021-01-09

## 2021-10-11 ENCOUNTER — HEALTH MAINTENANCE LETTER (OUTPATIENT)
Age: 41
End: 2021-10-11

## 2022-01-30 ENCOUNTER — HEALTH MAINTENANCE LETTER (OUTPATIENT)
Age: 42
End: 2022-01-30

## 2022-09-24 ENCOUNTER — HEALTH MAINTENANCE LETTER (OUTPATIENT)
Age: 42
End: 2022-09-24

## 2023-05-08 ENCOUNTER — HEALTH MAINTENANCE LETTER (OUTPATIENT)
Age: 43
End: 2023-05-08

## 2024-03-02 ENCOUNTER — HEALTH MAINTENANCE LETTER (OUTPATIENT)
Age: 44
End: 2024-03-02

## (undated) DEVICE — ESU GROUND PAD ADULT W/CORD E7507

## (undated) DEVICE — Device

## (undated) DEVICE — GLOVE PROTEXIS W/NEU-THERA 7.0  2D73TE70

## (undated) DEVICE — SU VICRYL 2-0 CT-2 27" UND J269H

## (undated) DEVICE — ESU PENCIL W/SMOKE EVAC NEPTUNE STRYKER 0703-046-000

## (undated) DEVICE — ESU HOLDER LAP INST DISP YELLOW SHORT 250MM H-PRO-250

## (undated) DEVICE — SOL NACL 0.9% IRRIG 3000ML BAG 2B7477

## (undated) DEVICE — SOL WATER IRRIG 1000ML BOTTLE 2F7114

## (undated) DEVICE — STRAP KNEE/BODY 31143004

## (undated) DEVICE — DRAPE U-DRAPE 1015NSD NON-STERILE

## (undated) DEVICE — BUR ARTHREX COOLCUT SABRE 4.0MMX13CM AR-8400SR

## (undated) DEVICE — SYR 10ML FINGER CONTROL W/O NDL 309695

## (undated) DEVICE — GOWN XLG DISP 9545

## (undated) DEVICE — SU VICRYL 2-0 CT-1 27" UND J259H

## (undated) DEVICE — LINEN TOWEL PACK X5 5464

## (undated) DEVICE — SOL NACL 0.9% IRRIG 1000ML BOTTLE 2F7124

## (undated) DEVICE — GLOVE PROTEXIS W/NEU-THERA 7.5  2D73TE75

## (undated) DEVICE — ABLATOR ARTHREX APOLLO RF MP90 ASPIRATING 90DEG AR-9811

## (undated) DEVICE — PACK ACL SUPPLEMENT STD

## (undated) DEVICE — SU ETHILON 3-0 PS-1 18" 1663H

## (undated) DEVICE — SUCTION MANIFOLD DORNOCH ULTRA CART UL-CL500

## (undated) DEVICE — LINEN ORTHO PACK 5446

## (undated) DEVICE — GLOVE PROTEXIS POWDER FREE 7.0 ORTHOPEDIC 2D73ET70

## (undated) DEVICE — DRSG STERI STRIP 1/2X4" R1547

## (undated) DEVICE — TUBING ARTHROSCOPY PUMP ARTHREX AR-6410

## (undated) DEVICE — NDL 18GA 1.5" 305196

## (undated) DEVICE — DECANTER TRANSFER DEVICE 2008S

## (undated) DEVICE — TUBING SUCTION MEDI-VAC 1/4"X20' N620A

## (undated) DEVICE — LINEN GOWN XLG 5407

## (undated) DEVICE — SU VICRYL 0 CT 36" J358H

## (undated) RX ORDER — BUPIVACAINE HYDROCHLORIDE AND EPINEPHRINE 5; 5 MG/ML; UG/ML
INJECTION, SOLUTION EPIDURAL; INTRACAUDAL; PERINEURAL
Status: DISPENSED
Start: 2020-12-09

## (undated) RX ORDER — DEXAMETHASONE SODIUM PHOSPHATE 4 MG/ML
INJECTION, SOLUTION INTRA-ARTICULAR; INTRALESIONAL; INTRAMUSCULAR; INTRAVENOUS; SOFT TISSUE
Status: DISPENSED
Start: 2020-12-09

## (undated) RX ORDER — ONDANSETRON 2 MG/ML
INJECTION INTRAMUSCULAR; INTRAVENOUS
Status: DISPENSED
Start: 2020-12-09

## (undated) RX ORDER — FENTANYL CITRATE 50 UG/ML
INJECTION, SOLUTION INTRAMUSCULAR; INTRAVENOUS
Status: DISPENSED
Start: 2020-12-09

## (undated) RX ORDER — KETOROLAC TROMETHAMINE 30 MG/ML
INJECTION, SOLUTION INTRAMUSCULAR; INTRAVENOUS
Status: DISPENSED
Start: 2020-12-09

## (undated) RX ORDER — LIDOCAINE HYDROCHLORIDE 20 MG/ML
INJECTION, SOLUTION EPIDURAL; INFILTRATION; INTRACAUDAL; PERINEURAL
Status: DISPENSED
Start: 2020-12-09

## (undated) RX ORDER — ACETAMINOPHEN 325 MG/1
TABLET ORAL
Status: DISPENSED
Start: 2020-12-09

## (undated) RX ORDER — PROPOFOL 10 MG/ML
INJECTION, EMULSION INTRAVENOUS
Status: DISPENSED
Start: 2020-12-09

## (undated) RX ORDER — CEFAZOLIN SODIUM 2 G/100ML
INJECTION, SOLUTION INTRAVENOUS
Status: DISPENSED
Start: 2020-12-09

## (undated) RX ORDER — CEFAZOLIN SODIUM 1 G/3ML
INJECTION, POWDER, FOR SOLUTION INTRAMUSCULAR; INTRAVENOUS
Status: DISPENSED
Start: 2020-12-09